# Patient Record
Sex: FEMALE | URBAN - METROPOLITAN AREA
[De-identification: names, ages, dates, MRNs, and addresses within clinical notes are randomized per-mention and may not be internally consistent; named-entity substitution may affect disease eponyms.]

---

## 2018-11-12 NOTE — PROGRESS NOTES
[place] [facility]  PRIMARY CARE PROVIDER AND CLINIC:  Priyanka Sainz [place] [address] / [place] [address]  Chief Complaint   Patient presents with     [facility]     [place] Medical Record Number:  [id number]  Place of Service where encounter took place:  [facility] ([facility]) [[id number]]    HPI:    Slim Sams is a 89 year old  (8/4/1924),admitted to the above facility from  Home.  Admitted to this facility for  rehab, medical management and nursing care.  HPI information obtained from: facility chart records, facility staff, patient report and [facility] chart review. Current issues are:    This is a 89-year-old female, with a past medical history significant for dementia, hypertension and adjustment disorder, who recently moved to [facility] from a different Assisted Living after living at home until September of 2018. Today, patient is walking up to the [employer] shop to have her hair done. Has no complaints. Reports she has children who live nearby. Was a  taking care of her children when she was younger. Denies pain and shortness of breath.    Spoke to daughter in-law, Jennifer, who reports patient has trouble with her memory. Is settling in to new assisted living, but keeps saying she's not sure how long she'll be staying. Has frequent visitors between her sons, daughter in-law and daughter. Is still able to get into her son's large truck with some help. Confirms DNR/DNI status. Would like her mother in-law to be transported to the hospital if indicated.    CODE STATUS/ADVANCE DIRECTIVES DISCUSSION:   DNR / DNI  Patient's living condition: lives in an assisted living facility    ALLERGIES:Brimonidine and Timolol maleate [timolol]  PAST MEDICAL HISTORY:  has a past medical history of Adjustment disorder with anxiety; Allergic conjunctivitis; Astigmatism; Blepharitis; Dementia; Glaucoma; Hypercholesteremia; Hypertension;  MRSA infection (2010); Osteopenia; Pseudophakia; and Varicella.  PAST SURGICAL HISTORY:  has a past surgical history that includes hysterectomy, leeann.  FAMILY HISTORY: family history includes Cancer in her sister; Cataracts in her father and mother; Cerebrovascular Disease in her mother; Glaucoma in her father; Hypertension in her mother.  SOCIAL HISTORY:  reports that she has never smoked. She has never used smokeless tobacco. She reports that she does not drink alcohol or use illicit drugs.    Post Discharge Medication Reconciliation Status: discharge medications reconciled, continue medications without change.  Current Outpatient Prescriptions   Medication Sig Dispense Refill     AMLODIPINE BESYLATE PO Take 5 mg by mouth daily       ammonium lactate (AMLACTIN) 12 % cream Apply topically 2 times daily       brinzolamide (AZOPT) 1 % ophthalmic susp 1 drop 3 times daily       calcium carbonate 600 mg-vitamin D 400 units (CALTRATE) 600-400 MG-UNIT per tablet Take 1 tablet by mouth 2 times daily       cholecalciferol (VITAMIN  -D) 1000 units capsule Take 1 capsule by mouth daily       dorzolamide (TRUSOPT) 2 % ophthalmic solution 1 drop 3 times daily       FLUOXETINE HCL PO Take 20 mg by mouth daily       FLUOXETINE HCL PO Take 10 mg by mouth as needed       MAGNESIUM OXIDE PO Take 400 mg by mouth daily       METOPROLOL SUCCINATE ER PO Take 25 mg by mouth daily       Multiple Vitamins-Minerals (CEROVITE SENIOR PO) Take by mouth daily       QUETIAPINE FUMARATE PO Take 25 mg by mouth At Bedtime       spironolactone-hydrochlorothiazide (ALDACTAZIDE) 25-25 MG per tablet Take 0.5 tablets by mouth daily       travoprost, BAK Free, (TRAVATAN Z) 0.004 % ophthalmic solution 1 drop At Bedtime         ROS:  10 point ROS of systems including Constitutional, Eyes, Respiratory, Cardiovascular, Gastroenterology, Genitourinary, Integumentary, Musculoskeletal, Psychiatric were all negative except for pertinent positives noted in my  HPI.    Exam:  /60  Pulse 58  Temp 97.7  F (36.5  C)  Resp 16  Wt 139 lb 12.8 oz (63.4 kg)  SpO2 98%  GENERAL APPEARANCE:  Alert, in no distress  ENT:  Mouth and posterior oropharynx normal, moist mucous membranes  EYES:  EOM, conjunctivae, lids, pupils and irises normal  RESP:  respiratory effort and palpation of chest normal, lungs clear to auscultation , no respiratory distress  CV:  Palpation and auscultation of heart done , regular rate and rhythm, no murmur, rub, or gallop  ABDOMEN:  normal bowel sounds, soft, nontender, no hepatosplenomegaly or other masses  M/S:   Active movement of bilateral upper and lower extremities. No edema.  SKIN:  Inspection of skin and subcutaneous tissue baseline, Palpation of skin and subcutaneous tissue baseline  NEURO:   Cranial nerves 2-12 are normal tested and grossly at patient's baseline  PSYCH:  memory impaired     Lab/Diagnostic data:   Basic Metabolic Panel 6/25/18  Sodium 139  Potassium 3.7  Chloride 101  CO2 29  Anion Gap 9  Calcium 9.8  BUN 23  Creatinine 0.69  EGFR > 60    Complete Blood Cell Count 6/25/18  WBC 7.8  RBC 4.06  Hgb 12.6  Hematocrit 37.0  MCV 91  MCH 31.1  MCHC 34.1  RDW 12.6  Platelet 210    ASSESSMENT/PLAN:  Mixed Dementia. Chronic, progressive. Requires 24 hour supervision. Resides in secure memory unit [facility]. Able to make needs known. Ambulates independently. Mini-Cog Assessment Score 1 on 6/25/18. SLUMS Score 4 on 10/30/18. Unable to tolerate Namenda. Staff to assist with ADLs and meals.    Adjustment Disorder with Anxiety. Symptoms include palpitations and headaches. Previously on Mirtazapine and Lorazepam. PHQ-2 0 on 6/25/18. Geriatric Depression Score 1 on 10/29/18. Uncertain why and when Quetiapine was initiated, but appears to have been on since at least 2015. Fluoxetine increased 6/25/18 due to increasing anxiety.     Hypertension/Hyperlipidemia. Monitor blood pressure monthly. Goal blood pressure <150/90 given age and  co-morbidities. Continue Amlodipine, Metoprolol and Spironolactone-Hydrochlorothiazide as ordered.    Glaucoma. Followed by Dr. Sary Calvillo at [facility]. Follow-up recommended after 2019. Continue Dorzolamide, Betimol and Travatan as ordered. Taking Brinzolamide, but this is not listed on Ophthalmology note. Will need to clarify with daughter in-law and/or Dr. Calvillo.     Osteopenia. Continue Calcium and Vitamin D supplements as ordered.     Total time with a new patient visit in the assisted livin minutes including discussions about the POC and care coordination with the patient and family, daughter in-law Jennifer. Greater than 50% of total time spent with counseling and coordinating care due to review of past medical record, visit with patient and phone call to daughter in-law    Electronically signed by:  NIEVES Carranza CNP

## 2018-11-13 RX ORDER — TRAVOPROST OPHTHALMIC SOLUTION 0.04 MG/ML
1 SOLUTION OPHTHALMIC AT BEDTIME
COMMUNITY
End: 2021-01-01

## 2018-11-13 RX ORDER — BRINZOLAMIDE 10 MG/ML
1 SUSPENSION/ DROPS OPHTHALMIC 3 TIMES DAILY
COMMUNITY
End: 2019-09-23

## 2018-11-13 RX ORDER — SPIRONOLACTONE AND HYDROCHLOROTHIAZIDE 25; 25 MG/1; MG/1
0.5 TABLET ORAL DAILY
COMMUNITY
End: 2020-12-15

## 2018-11-13 RX ORDER — DORZOLAMIDE HCL 20 MG/ML
1 SOLUTION/ DROPS OPHTHALMIC 3 TIMES DAILY
COMMUNITY
End: 2021-01-01

## 2018-11-13 RX ORDER — AMMONIUM LACTATE 12 G/100G
CREAM TOPICAL 2 TIMES DAILY PRN
COMMUNITY
End: 2019-10-25

## 2018-11-14 ENCOUNTER — ASSISTED LIVING VISIT (OUTPATIENT)
Dept: GERIATRICS | Facility: CLINIC | Age: 83
End: 2018-11-14
Payer: COMMERCIAL

## 2018-11-14 VITALS
OXYGEN SATURATION: 98 % | WEIGHT: 139.8 LBS | DIASTOLIC BLOOD PRESSURE: 60 MMHG | HEART RATE: 58 BPM | TEMPERATURE: 97.7 F | RESPIRATION RATE: 16 BRPM | SYSTOLIC BLOOD PRESSURE: 141 MMHG

## 2018-11-14 DIAGNOSIS — E78.00 HYPERCHOLESTEREMIA: ICD-10-CM

## 2018-11-14 DIAGNOSIS — F01.50 MIXED DEMENTIA (H): Primary | ICD-10-CM

## 2018-11-14 DIAGNOSIS — I10 ESSENTIAL HYPERTENSION: ICD-10-CM

## 2018-11-14 DIAGNOSIS — F43.22 ADJUSTMENT DISORDER WITH ANXIETY: ICD-10-CM

## 2018-11-14 DIAGNOSIS — H40.1133 PRIMARY OPEN ANGLE GLAUCOMA OF BOTH EYES, SEVERE STAGE: ICD-10-CM

## 2018-11-14 DIAGNOSIS — G30.9 MIXED DEMENTIA (H): Primary | ICD-10-CM

## 2018-11-14 DIAGNOSIS — F02.80 MIXED DEMENTIA (H): Primary | ICD-10-CM

## 2018-11-14 NOTE — LETTER
11/14/2018        RE: Slim Sams  [facility]  [address]  [place] [address]        [place] [facility]  PRIMARY CARE PROVIDER AND CLINIC:  Priyanka Sainz [address] [place] [address] / [facility] [address]  Chief Complaint   Patient presents with     Establish Care     [place] Medical Record Number:  [id number]  Place of Service where encounter took place:  [facility] [place] ([facility]) [[id number]]    HPI:    Slim Sams is a 89 year old  (8/4/1924),admitted to the above facility from  Home.  Admitted to this facility for  rehab, medical management and nursing care.  HPI information obtained from: facility chart records, facility staff, patient report and [facility] chart review. Current issues are:    This is a 89-year-old female, with a past medical history significant for dementia, hypertension and adjustment disorder, who recently moved to [facility] from a different Assisted Living after living at home until September of 2018. Today, patient is walking up to the [employer] shop to have her hair done. Has no complaints. Reports she has children who live nearby. Was a  taking care of her children when she was younger. Denies pain and shortness of breath.    Spoke to daughter in-law, Jennifer, who reports patient has trouble with her memory. Is settling in to new assisted living, but keeps saying she's not sure how long she'll be staying. Has frequent visitors between her sons, daughter in-law and daughter. Is still able to get into her son's large truck with some help. Confirms DNR/DNI status. Would like her mother in-law to be transported to the hospital if indicated.    CODE STATUS/ADVANCE DIRECTIVES DISCUSSION:   DNR / DNI  Patient's living condition: lives in an assisted living facility    ALLERGIES:Brimonidine and Timolol maleate [timolol]  PAST MEDICAL HISTORY:  has a past medical history of Adjustment  disorder with anxiety; Allergic conjunctivitis; Astigmatism; Blepharitis; Dementia; Glaucoma; Hypercholesteremia; Hypertension; MRSA infection (2010); Osteopenia; Pseudophakia; and Varicella.  PAST SURGICAL HISTORY:  has a past surgical history that includes hysterectomy, leeann.  FAMILY HISTORY: family history includes Cancer in her sister; Cataracts in her father and mother; Cerebrovascular Disease in her mother; Glaucoma in her father; Hypertension in her mother.  SOCIAL HISTORY:  reports that she has never smoked. She has never used smokeless tobacco. She reports that she does not drink alcohol or use illicit drugs.    Post Discharge Medication Reconciliation Status: discharge medications reconciled, continue medications without change.  Current Outpatient Prescriptions   Medication Sig Dispense Refill     AMLODIPINE BESYLATE PO Take 5 mg by mouth daily       ammonium lactate (AMLACTIN) 12 % cream Apply topically 2 times daily       brinzolamide (AZOPT) 1 % ophthalmic susp 1 drop 3 times daily       calcium carbonate 600 mg-vitamin D 400 units (CALTRATE) 600-400 MG-UNIT per tablet Take 1 tablet by mouth 2 times daily       cholecalciferol (VITAMIN  -D) 1000 units capsule Take 1 capsule by mouth daily       dorzolamide (TRUSOPT) 2 % ophthalmic solution 1 drop 3 times daily       FLUOXETINE HCL PO Take 20 mg by mouth daily       FLUOXETINE HCL PO Take 10 mg by mouth as needed       MAGNESIUM OXIDE PO Take 400 mg by mouth daily       METOPROLOL SUCCINATE ER PO Take 25 mg by mouth daily       Multiple Vitamins-Minerals (CEROVITE SENIOR PO) Take by mouth daily       QUETIAPINE FUMARATE PO Take 25 mg by mouth At Bedtime       spironolactone-hydrochlorothiazide (ALDACTAZIDE) 25-25 MG per tablet Take 0.5 tablets by mouth daily       travoprost, BAK Free, (TRAVATAN Z) 0.004 % ophthalmic solution 1 drop At Bedtime         ROS:  10 point ROS of systems including Constitutional, Eyes, Respiratory, Cardiovascular,  Gastroenterology, Genitourinary, Integumentary, Musculoskeletal, Psychiatric were all negative except for pertinent positives noted in my HPI.    Exam:  /60  Pulse 58  Temp 97.7  F (36.5  C)  Resp 16  Wt 139 lb 12.8 oz (63.4 kg)  SpO2 98%  GENERAL APPEARANCE:  Alert, in no distress  ENT:  Mouth and posterior oropharynx normal, moist mucous membranes  EYES:  EOM, conjunctivae, lids, pupils and irises normal  RESP:  respiratory effort and palpation of chest normal, lungs clear to auscultation , no respiratory distress  CV:  Palpation and auscultation of heart done , regular rate and rhythm, no murmur, rub, or gallop  ABDOMEN:  normal bowel sounds, soft, nontender, no hepatosplenomegaly or other masses  M/S:   Active movement of bilateral upper and lower extremities. No edema.  SKIN:  Inspection of skin and subcutaneous tissue baseline, Palpation of skin and subcutaneous tissue baseline  NEURO:   Cranial nerves 2-12 are normal tested and grossly at patient's baseline  PSYCH:  memory impaired     Lab/Diagnostic data:   Basic Metabolic Panel 6/25/18  Sodium 139  Potassium 3.7  Chloride 101  CO2 29  Anion Gap 9  Calcium 9.8  BUN 23  Creatinine 0.69  EGFR > 60    Complete Blood Cell Count 6/25/18  WBC 7.8  RBC 4.06  Hgb 12.6  Hematocrit 37.0  MCV 91  MCH 31.1  MCHC 34.1  RDW 12.6  Platelet 210    ASSESSMENT/PLAN:  Mixed Dementia. Chronic, progressive. Requires 24 hour supervision. Resides in secure memory unit of AL. Able to make needs known. Ambulates independently. Mini-Cog Assessment Score 1 on 6/25/18. SLUMS Score 4 on 10/30/18. Unable to tolerate Namenda. Staff to assist with ADLs and meals.    Adjustment Disorder with Anxiety. Symptoms include palpitations and headaches. Previously on Mirtazapine and Lorazepam. PHQ-2 0 on 6/25/18. Geriatric Depression Score 1 on 10/29/18. Uncertain why and when Quetiapine was initiated, but appears to have been on since at least 2015. Fluoxetine increased 6/25/18 due to  increasing anxiety.     Hypertension/Hyperlipidemia. Monitor blood pressure monthly. Goal blood pressure <150/90 given age and co-morbidities. Continue Amlodipine, Metoprolol and Spironolactone-Hydrochlorothiazide as ordered.    Glaucoma. Followed by Dr. Sary Calvillo at [facility]. Follow-up recommended after 2019. Continue Dorzolamide, Betimol and Travatan as ordered. Taking Brinzolamide, but this is not listed on Ophthalmology note. Will need to clarify with daughter in-law and/or Dr. Calvillo.     Osteopenia. Continue Calcium and Vitamin D supplements as ordered.     Total time with a new patient visit in the assisted livin minutes including discussions about the POC and care coordination with the patient and family, daughter in-law Jennifer. Greater than 50% of total time spent with counseling and coordinating care due to review of past medical record, visit with patient and phone call to daughter in-law    Electronically signed by:  NIEVES Carranza CNP                    Sincerely,        NIEVES Carranza CNP

## 2018-11-15 PROBLEM — G30.9 MIXED DEMENTIA (H): Status: ACTIVE | Noted: 2018-09-17

## 2018-11-15 PROBLEM — F01.50 MIXED DEMENTIA (H): Status: ACTIVE | Noted: 2018-09-17

## 2018-11-15 PROBLEM — F02.80 MIXED DEMENTIA (H): Status: ACTIVE | Noted: 2018-09-17

## 2019-01-09 ENCOUNTER — ASSISTED LIVING VISIT (OUTPATIENT)
Dept: GERIATRICS | Facility: CLINIC | Age: 84
End: 2019-01-09
Payer: MEDICARE

## 2019-01-09 VITALS
DIASTOLIC BLOOD PRESSURE: 62 MMHG | SYSTOLIC BLOOD PRESSURE: 128 MMHG | WEIGHT: 143.6 LBS | TEMPERATURE: 98.2 F | OXYGEN SATURATION: 97 % | HEART RATE: 60 BPM | RESPIRATION RATE: 16 BRPM

## 2019-01-09 DIAGNOSIS — E78.5 HYPERLIPIDEMIA, UNSPECIFIED HYPERLIPIDEMIA TYPE: ICD-10-CM

## 2019-01-09 DIAGNOSIS — F03.90 DEMENTIA WITHOUT BEHAVIORAL DISTURBANCE, UNSPECIFIED DEMENTIA TYPE: Primary | ICD-10-CM

## 2019-01-09 DIAGNOSIS — I10 BENIGN ESSENTIAL HYPERTENSION: ICD-10-CM

## 2019-01-09 DIAGNOSIS — H40.9 GLAUCOMA, UNSPECIFIED GLAUCOMA TYPE, UNSPECIFIED LATERALITY: ICD-10-CM

## 2019-01-09 DIAGNOSIS — F43.22 ADJUSTMENT DISORDER WITH ANXIOUS MOOD: ICD-10-CM

## 2019-01-09 DIAGNOSIS — M85.80 OSTEOPENIA, UNSPECIFIED LOCATION: ICD-10-CM

## 2019-01-09 NOTE — PROGRESS NOTES
[place] [facility]  Chief Complaint   Patient presents with     Annual Comprehensive Exam Assisted Living       [place] Medical Record Number:  [id number]  Place of Service where encounter took place:  [facility] ([facility] [[id number]]    HPI:    Slim Sams is a 89 year old  (8/4/1924), who is being seen today for an annual comprehensive visit.  HPI information obtained from: facility chart records, facility staff, patient report, [facility] chart review, Care Everywhere [facility] chart review and family/first contact Jennifer, daughter in-law report.  Today's concerns are:    Mixed Dementia. Resides in secure memory unit of AL. Able to make needs known. Ambulates independently. Mini-Cog Assessment Score 1 on 6/25/18. SLUMS Score 4 on 10/30/18.      Adjustment Disorder with Anxiety. No reports of behaviors noted by staff since AL admission 10/27/18. Per daughter in-law's report, when patient was initially admitted to [facility] in September, had difficulty sleeping at night. Required daughter in-law stay with her overnight as she settled in. Since that time, has really had no displays of anxiety. Enjoys the AL. Takes care of herself. Likes to look nice and talk to other residents.      Hypertension/Hyperlipidemia. Upon review of blood pressure over the past 3 months, 2 readings available to review, 128/62 and  143/65. No recent lipid panel on file.      Glaucoma.  No recent reports of vision changes. Followed by Dr. Sary Calvillo at [facility]. Has an allergy to Timolol, but remains on this eye drop.  Son and daughter in-law are aware of this.      Osteopenia. No reports of pain.     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  PAST SURGICAL HISTORY:  has a past surgical history that includes hysterectomy, leeann.  FAMILY HISTORY: family history includes Cancer in her sister; Cataracts in her father and mother; Cerebrovascular Disease in her mother; Glaucoma in her father; Hypertension in her mother.  SOCIAL HISTORY:  reports that  has never smoked. she has never used smokeless tobacco. She reports that she does not drink alcohol or use drugs.  IMMUNIZATIONS:  Most Recent Immunizations   Administered Date(s) Administered     DTaP, Unspecified 05/11/2010     Flu, Unspecified 10/15/1996     Influenza (High Dose) 3 valent vaccine 09/06/2018     Pneumo Conj 13-V (2010&after) 08/03/2015     Pneumococcal 23 valent 10/31/1995     Above immunizations pulled from [facility]. [facility] and facility records also reconciled. Outstanding information sent to  to update [facility].  Future immunizations are not needed at this point as all recommended immunizations are up to date.   MEDICATIONS:  Current Outpatient Medications   Medication Sig Dispense Refill     AMLODIPINE BESYLATE PO Take 5 mg by mouth daily       ammonium lactate (AMLACTIN) 12 % cream Apply topically 2 times daily       brinzolamide (AZOPT) 1 % ophthalmic susp 1 drop 3 times daily       calcium carbonate 600 mg-vitamin D 400 units (CALTRATE) 600-400 MG-UNIT per tablet Take 1 tablet by mouth 2 times daily       cholecalciferol (VITAMIN  -D) 1000 units capsule Take 1 capsule by mouth daily       dorzolamide  (TRUSOPT) 2 % ophthalmic solution 1 drop 3 times daily       FLUOXETINE HCL PO Take 20 mg by mouth daily       FLUOXETINE HCL PO Take 10 mg by mouth as needed       MAGNESIUM OXIDE PO Take 400 mg by mouth daily       METOPROLOL SUCCINATE ER PO Take 25 mg by mouth daily       Multiple Vitamins-Minerals (CEROVITE SENIOR PO) Take 1 tablet by mouth daily        QUETIAPINE FUMARATE PO Take 25 mg by mouth At Bedtime       spironolactone-hydrochlorothiazide (ALDACTAZIDE) 25-25 MG per tablet Take 0.5 tablets by mouth daily       timolol hemihydrate (BETIMOL) 0.5 % SOLN ophthalmic solution Place 1 drop into both eyes daily       travoprost, BAK Free, (TRAVATAN Z) 0.004 % ophthalmic solution 1 drop At Bedtime       Medications reviewed:  Medications reconciled to facility chart and changes were made to reflect current medications as identified as above med list. Below are the changes that were made:   Medications stopped since last EPIC medication reconciliation:   There are no discontinued medications.    Medications started since last [facility] medication reconciliation:  No orders of the defined types were placed in this encounter.      Case Management:  I have reviewed the Assisted Living care plan, current immunizations and preventive care/cancer screening..Future cancer screening is not clinically indicated secondary to age/goals of care Patient's desire to return to the community is not present. Current Level of Care is appropriate.    Advance Directive Discussion:    I reviewed the current advanced directives as reflected in EPIC, the POLST and the facility chart, and verified the congruency of orders. I contacted the first party Jennifer Sams and discussed the plan of Care.  I did not due to cognitive impairment review the advance directives with the resident.     Team Discussion:  I communicated with the appropriate disciplines involved with the Plan of Care:   Nursing      Patient Goal:  Patient's goal is pain control  and comfort.    Information reviewed:  Medications, vital signs, orders, and nursing notes.    ROS:  4 point ROS including Respiratory, CV, GI and , other than that noted in the HPI,  is negative    Exam:  /62   Pulse 60   Temp 98.2  F (36.8  C)   Resp 16   Wt 65.1 kg (143 lb 9.6 oz)   SpO2 97%   GENERAL APPEARANCE:  Alert, in no distress  ENT:  Mouth and posterior oropharynx normal, moist mucous membranes  EYES:  EOM, conjunctivae, lids, pupils and irises normal  RESP:  respiratory effort and palpation of chest normal, lungs clear to auscultation , no respiratory distress  CV:  Palpation and auscultation of heart done , regular rate and rhythm, no murmur, rub, or gallop  ABDOMEN:  normal bowel sounds, soft, nontender, no hepatosplenomegaly or other masses  M/S:   Active movement of bilateral upper and lower extremities. No edema.  SKIN:  Inspection of skin and subcutaneous tissue baseline, Palpation of skin and subcutaneous tissue baseline  NEURO:   Cranial nerves 2-12 are normal tested and grossly at patient's baseline  PSYCH:  memory impaired      Lab/Diagnostic data:   Basic Metabolic Panel 6/25/18  Sodium 139  Potassium 3.7  Chloride 101  CO2 29  Anion Gap 9  Calcium 9.8  BUN 23  Creatinine 0.69  EGFR > 60    Complete Blood Cell Count 6/25/18  WBC 7.8  RBC 4.06  Hgb 12.6  Hematocrit 37.0  MCV 91  MCH 31.1  MCHC 34.1  RDW 12.6  Platelet 210    ASSESSMENT/PLAN  Mixed Dementia. Chronic, progressive. Requires 24 hour supervision. Resides in secure memory unit of AL. Able to make needs known. Ambulates independently. Mini-Cog Assessment Score 1 on 6/25/18. SLUMS Score 4 on 10/30/18. Unable to tolerate Namenda. Staff to assist with ADLs and meals.     Adjustment Disorder with Anxiety. Symptoms include palpitations and headaches. Previously on Mirtazapine and Lorazepam. PHQ-2 0 on 6/25/18. Geriatric Depression Score 1 on 10/29/18. Uncertain why and when Quetiapine was initiated, but appears to have been  on since at least 2015. Fluoxetine increased 6/25/18 due to increasing anxiety. Discussed with daughter in-law, Jennifer, decreasing Quetiapine to determine if medication is still indicated. Daughter in-law was in agreement with decreasing Quetiapine from 25 mg PO at bedtime to 12.5 mg PO at bedtime. Also question the need for Fluoxetine PRN. Will follow-up with medication dosing at next visit.      Hypertension/Hyperlipidemia. Monitor blood pressure monthly. Goal blood pressure <150/90 given age and co-morbidities. Continue Amlodipine, Metoprolol and Spironolactone-Hydrochlorothiazide as ordered. Will need to check lipid panel with next labs. Based on JNC-8 goals,  patients age of 89 year old, no presence of diabetes or CKD, and goals of care goal BP is <150/90 mm Hg. Patient is stable with current plan of care and routine assessment..     Glaucoma. Followed by Dr. Sary Calvillo at [facility]. Follow-up recommended after March 14, 2019. Continue Dorzolamide, Betimol, Brinzolamide and Travatan as ordered.       Osteopenia. Continue Calcium and Vitamin D supplements as ordered    Electronically signed by:  NIEVES Carranza CNP

## 2019-01-09 NOTE — LETTER
1/9/2019        [facility]  [address] [facility] [address]        [place] [facility]  Chief Complaint   Patient presents with     Annual Comprehensive Exam Assisted Living       [place] Medical Record Number:  [id number]  [facility] [place] [facility] [[id number]]    HPI:    Slim Sams is a 89 year old  (8/4/1924), who is being seen today for an annual comprehensive visit.  HPI information obtained from: facility chart records, facility staff, patient report, [place] Epic chart review, Care Everywhere Epic chart review and family/first contact Jennifer, daughter in-law report.  Today's concerns are:    Mixed Dementia. Resides in secure memory unit of AL. Able to make needs known. Ambulates independently. Mini-Cog Assessment Score 1 on 6/25/18. SLUMS Score 4 on 10/30/18.      Adjustment Disorder with Anxiety. No reports of behaviors noted by staff since AL admission 10/27/18. Per daughter in-law's report, when patient was initially admitted to [facility] in September, had difficulty sleeping at night. Required daughter in-law stay with her overnight as she settled in. Since that time, has really had no displays of anxiety. Enjoys the AL. Takes care of herself. Likes to look nice and talk to other residents.      Hypertension/Hyperlipidemia. Upon review of blood pressure over the past 3 months, 2 readings available to review, 128/62 and  143/65. No recent lipid panel on file.      Glaucoma.  No recent reports of vision changes. Followed by Dr. Sary Calvillo at [facility]. Has an allergy to Timolol, but remains on this eye drop.  Son and daughter in-law are aware of this.      Osteopenia. No reports of pain.     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  PAST SURGICAL HISTORY:  has a past surgical history that includes hysterectomy, leeann.  FAMILY HISTORY: family history includes Cancer in her sister; Cataracts in her father and mother; Cerebrovascular Disease in her mother; Glaucoma in her father; Hypertension in her mother.  SOCIAL HISTORY:  reports that  has never smoked. she has never used smokeless tobacco. She reports that she does not drink alcohol or use drugs.  IMMUNIZATIONS:  Most Recent Immunizations   Administered Date(s) Administered     DTaP, Unspecified 05/11/2010     Flu, Unspecified 10/15/1996     Influenza (High Dose) 3 valent vaccine 09/06/2018     Pneumo Conj 13-V (2010&after) 08/03/2015     Pneumococcal 23 valent 10/31/1995     Above immunizations pulled from [facility]. [facility] and facility records also reconciled. Outstanding information sent to  to update [facility].  Future immunizations are not needed at this point as all recommended immunizations are up to date.   MEDICATIONS:  Current Outpatient Medications   Medication Sig Dispense Refill     AMLODIPINE BESYLATE PO Take 5 mg by mouth daily       ammonium lactate (AMLACTIN) 12 % cream Apply topically 2 times daily       brinzolamide (AZOPT) 1 % ophthalmic susp 1 drop 3 times daily       calcium carbonate 600 mg-vitamin D 400 units (CALTRATE) 600-400 MG-UNIT per tablet Take 1 tablet by mouth 2 times  daily       cholecalciferol (VITAMIN  -D) 1000 units capsule Take 1 capsule by mouth daily       dorzolamide (TRUSOPT) 2 % ophthalmic solution 1 drop 3 times daily       FLUOXETINE HCL PO Take 20 mg by mouth daily       FLUOXETINE HCL PO Take 10 mg by mouth as needed       MAGNESIUM OXIDE PO Take 400 mg by mouth daily       METOPROLOL SUCCINATE ER PO Take 25 mg by mouth daily       Multiple Vitamins-Minerals (CEROVITE SENIOR PO) Take 1 tablet by mouth daily        QUETIAPINE FUMARATE PO Take 25 mg by mouth At Bedtime       spironolactone-hydrochlorothiazide (ALDACTAZIDE) 25-25 MG per tablet Take 0.5 tablets by mouth daily       timolol hemihydrate (BETIMOL) 0.5 % SOLN ophthalmic solution Place 1 drop into both eyes daily       travoprost, BAK Free, (TRAVATAN Z) 0.004 % ophthalmic solution 1 drop At Bedtime       Medications reviewed:  Medications reconciled to facility chart and changes were made to reflect current medications as identified as above med list. Below are the changes that were made:   Medications stopped since last EPIC medication reconciliation:   There are no discontinued medications.    Medications started since last [facility] medication reconciliation:  No orders of the defined types were placed in this encounter.      Case Management:  I have reviewed the Assisted Living care plan, current immunizations and preventive care/cancer screening..Future cancer screening is not clinically indicated secondary to age/goals of care Patient's desire to return to the community is not present. Current Level of Care is appropriate.    Advance Directive Discussion:    I reviewed the current advanced directives as reflected in EPIC, the POLST and the facility chart, and verified the congruency of orders. I contacted the first party Jennifer Sams and discussed the plan of Care.  I did not due to cognitive impairment review the advance directives with the resident.     Team Discussion:  I communicated with the  appropriate disciplines involved with the Plan of Care:   Nursing      Patient Goal:  Patient's goal is pain control and comfort.    Information reviewed:  Medications, vital signs, orders, and nursing notes.    ROS:  4 point ROS including Respiratory, CV, GI and , other than that noted in the HPI,  is negative    Exam:  /62   Pulse 60   Temp 98.2  F (36.8  C)   Resp 16   Wt 65.1 kg (143 lb 9.6 oz)   SpO2 97%   GENERAL APPEARANCE:  Alert, in no distress  ENT:  Mouth and posterior oropharynx normal, moist mucous membranes  EYES:  EOM, conjunctivae, lids, pupils and irises normal  RESP:  respiratory effort and palpation of chest normal, lungs clear to auscultation , no respiratory distress  CV:  Palpation and auscultation of heart done , regular rate and rhythm, no murmur, rub, or gallop  ABDOMEN:  normal bowel sounds, soft, nontender, no hepatosplenomegaly or other masses  M/S:   Active movement of bilateral upper and lower extremities. No edema.  SKIN:  Inspection of skin and subcutaneous tissue baseline, Palpation of skin and subcutaneous tissue baseline  NEURO:   Cranial nerves 2-12 are normal tested and grossly at patient's baseline  PSYCH:  memory impaired      Lab/Diagnostic data:   Basic Metabolic Panel 6/25/18  Sodium 139  Potassium 3.7  Chloride 101  CO2 29  Anion Gap 9  Calcium 9.8  BUN 23  Creatinine 0.69  EGFR > 60    Complete Blood Cell Count 6/25/18  WBC 7.8  RBC 4.06  Hgb 12.6  Hematocrit 37.0  MCV 91  MCH 31.1  MCHC 34.1  RDW 12.6  Platelet 210    ASSESSMENT/PLAN  Mixed Dementia. Chronic, progressive. Requires 24 hour supervision. Resides in secure [facility] unit [facility]. Able to make needs known. Ambulates independently. Mini-Cog Assessment Score 1 on 6/25/18. SLUMS Score 4 on 10/30/18. Unable to tolerate Namenda. Staff to assist with ADLs and meals.     Adjustment Disorder with Anxiety. Symptoms include palpitations and headaches. Previously on Mirtazapine and Lorazepam. PHQ-2 0 on 6/25/18.  Geriatric Depression Score 1 on 10/29/18. Uncertain why and when Quetiapine was initiated, but appears to have been on since at least 2015. Fluoxetine increased 6/25/18 due to increasing anxiety. Discussed with daughter in-law, Jennifer, decreasing Quetiapine to determine if medication is still indicated. Daughter in-law was in agreement with decreasing Quetiapine from 25 mg PO at bedtime to 12.5 mg PO at bedtime. Also question the need for Fluoxetine PRN. Will follow-up with medication dosing at next visit.      Hypertension/Hyperlipidemia. Monitor blood pressure monthly. Goal blood pressure <150/90 given age and co-morbidities. Continue Amlodipine, Metoprolol and Spironolactone-Hydrochlorothiazide as ordered. Will need to check lipid panel with next labs.      Glaucoma. Followed by Dr. Sary Calvillo at [facility]. Follow-up recommended after March 14, 2019. Continue Dorzolamide, Betimol, Brinzolamide and Travatan as ordered.       Osteopenia. Continue Calcium and Vitamin D supplements as ordered    Electronically signed by:  NIEVES Carranza CNP          Sincerely,        NIEVES Carranza CNP

## 2019-01-23 DIAGNOSIS — H40.9 GLAUCOMA, UNSPECIFIED GLAUCOMA TYPE, UNSPECIFIED LATERALITY: Primary | ICD-10-CM

## 2019-01-23 RX ORDER — TIMOLOL MALEATE 5 MG/ML
SOLUTION/ DROPS OPHTHALMIC
Qty: 5 ML | Refills: 97 | Status: SHIPPED | OUTPATIENT
Start: 2019-01-23 | End: 2020-01-29

## 2019-07-11 ENCOUNTER — ASSISTED LIVING VISIT (OUTPATIENT)
Dept: GERIATRICS | Facility: CLINIC | Age: 84
End: 2019-07-11
Payer: MEDICARE

## 2019-07-11 VITALS
HEART RATE: 56 BPM | TEMPERATURE: 98.8 F | RESPIRATION RATE: 18 BRPM | DIASTOLIC BLOOD PRESSURE: 63 MMHG | SYSTOLIC BLOOD PRESSURE: 136 MMHG | WEIGHT: 139 LBS

## 2019-07-11 DIAGNOSIS — I10 BENIGN ESSENTIAL HYPERTENSION: ICD-10-CM

## 2019-07-11 DIAGNOSIS — F43.22 ADJUSTMENT DISORDER WITH ANXIOUS MOOD: ICD-10-CM

## 2019-07-11 DIAGNOSIS — F03.90 DEMENTIA WITHOUT BEHAVIORAL DISTURBANCE, UNSPECIFIED DEMENTIA TYPE: Primary | ICD-10-CM

## 2019-07-11 NOTE — LETTER
7/11/2019        RE: Slim Sams  [facility]  [address] [address] [address]        [place] [facility]  [place] Medical Record Number:  [id number]  [facility] [place] [facility] [[id number]]  Chief Complaint   Patient presents with     RECHECK     HPI:    Slim Sams  is a 89 year old (8/4/1924), who is being seen today for an episodic care visit.  HPI information obtained from: facility chart records, facility staff, patient report, [facility] chart review and family/first contact daughter in-law, Jennifer report. Today's concern is:    Dementia without Behavioral Disturbance. Per daughter in-lawJennifer's, report, patient went to her cabin with her family on 7/4/19 through 7/6/19. Questioned who's cabin it was and who was paying for it. Felt better at night sleeping with her daughter in-law. Would wake up and use the bathroom in the middle of the night. Calls her son in the evening on most days. Doesn't remember where she is. Doesn't remember eating.Describes a situation where she would have a bowel movement in her underwear and then put her underwear back in her drawer. Per patient's report, questions where she is and how long she will be there. Requests to have daughter in-law's phone number. Has no other complaints.     Adjustment Disorder with Anxiety. Per daughter in-law's report, patient calls her sons most times in the evening or middle of the night as noted above. Has to be reminded where she is. Takes awhile to talk her down. May call back later and repeat the same questions. States it is challenging for patient's son to talk her down when she can't remember where she is or previous conversations.     Hypertension. Upon review of blood pressure over the past 3 months, 1 reading available to review on 4/13/19. 136/53.    Past Medical and Surgical History reviewed in Epic  today.    MEDICATIONS:  Current Outpatient Medications   Medication Sig Dispense Refill     AMLODIPINE BESYLATE PO Take 5 mg by mouth daily       ammonium lactate (AMLACTIN) 12 % cream Apply topically 2 times daily as needed        brinzolamide (AZOPT) 1 % ophthalmic susp 1 drop 3 times daily       calcium carbonate 600 mg-vitamin D 400 units (CALTRATE) 600-400 MG-UNIT per tablet Take 1 tablet by mouth 2 times daily       cholecalciferol (VITAMIN  -D) 1000 units capsule Take 1 capsule by mouth daily       dorzolamide (TRUSOPT) 2 % ophthalmic solution 1 drop 3 times daily       FLUOXETINE HCL PO Take 30 mg by mouth daily        MAGNESIUM OXIDE PO Take 400 mg by mouth daily       METOPROLOL SUCCINATE ER PO Take 25 mg by mouth daily       Multiple Vitamins-Minerals (CEROVITE SENIOR PO) Take 1 tablet by mouth daily        QUETIAPINE FUMARATE PO Take 12.5 mg by mouth At Bedtime        spironolactone-hydrochlorothiazide (ALDACTAZIDE) 25-25 MG per tablet Take 0.5 tablets by mouth daily       timolol maleate (TIMOPTIC) 0.5 % ophthalmic solution INSTILL ONE DROP IN EACH EYE ONCE DAILY 5 mL 97     travoprost, BAK Free, (TRAVATAN Z) 0.004 % ophthalmic solution 1 drop At Bedtime       REVIEW OF SYSTEMS:  Unobtainable secondary to cognitive impairment.     Objective:  /63   Pulse 56   Temp 98.8  F (37.1  C)   Resp 18   Wt 63 kg (139 lb)   Exam:  GENERAL APPEARANCE:  Alert, in no distress  ENT:  Mouth and posterior oropharynx normal, moist mucous membranes  EYES:  EOM, conjunctivae, lids, pupils and irises normal  RESP:  respiratory effort and palpation of chest normal, lungs clear to auscultation , no respiratory distress  CV:  Palpation and auscultation of heart done , regular rate and rhythm, no murmur, rub, or gallop  ABDOMEN:  normal bowel sounds, soft, nontender, no hepatosplenomegaly or other masses  M/S:   Active movement of bilateral upper and lower extremities. No edema.  SKIN:  Inspection of skin and  subcutaneous tissue baseline, Palpation of skin and subcutaneous tissue baseline  NEURO:   Cranial nerves 2-12 are normal tested and grossly at patient's baseline  PSYCH:  memory impaired     Labs:   Basic Metabolic Panel 6/25/18  Sodium 139  Potassium 3.7  Chloride 101  CO2 29  Anion Gap 9  Calcium 9.8  BUN 23  Creatinine 0.69  EGFR > 60     Complete Blood Cell Count 6/25/18  WBC 7.8  RBC 4.06  Hgb 12.6  Hematocrit 37.0  MCV 91  MCH 31.1  MCHC 34.1  RDW 12.6  Platelet 210    ASSESSMENT/PLAN:  Dementia without Behavioral Disturbance. Chronic, progressive. Requires 24 hour supervision. Resides in secure memory unit [facility]. Able to make needs known. Ambulates independently. Mini-Cog Assessment Score 1 on 6/25/18. SLUMS Score 4 on 10/30/18. Unable to tolerate Namenda. Staff to assist with ADLs and meals.Educated daughter in-law on disease and disease progression.     Adjustment Disorder with Anxiety. Symptoms include palpitations and headaches per former PCP notes. Previously on Mirtazapine and Lorazepam. Quetiapine dose decreased on 1/9/19. Fluoxetine increased 6/25/18 due to increasing anxiety.Previously on Fluoxetine 10 mg PO every day PRN, but this was discontinued due to non-use on 7/5/19. Unclear why this serotonin specific reuptake inhibitor was initially selected. Due to daughter in-law's report of increased anxiety, will increase Fluoxetine to 30 mg PO every day. If no improvement in anxiety or worsening symptoms/behaviors at night, consider increasing Quetiapine to determine if symptoms improve. Will also check BMP later this month to ensure stability while on medication.     Hypertension. Limited blood pressures available to review. Should be receiving monthly vital signs given enrollment in [facility]. Continue Amlodipine, Metoprolol and Spironolactone-Hydrochlorothiazide as ordered for now.      Electronically signed by:  NIEVES Carranza CNP           Sincerely,        NIEVES Carranza  CNP

## 2019-07-11 NOTE — PROGRESS NOTES
[place] [facility]  [place] Medical Record Number:  [id number]  Place of Service where encounter took place:  [facility] [place] ([facility]) [[id number]]  Chief Complaint   Patient presents with     RECHECK     HPI:    Slim Sams  is a 89 year old (8/4/1924), who is being seen today for an episodic care visit.  HPI information obtained from: facility chart records, facility staff, patient report, [facility] chart review and family/first contact daughter in-lawJennifer report. Today's concern is:    Dementia without Behavioral Disturbance. Per daughter in-lawJennifer's, report, patient went to her cabin with her family on 7/4/19 through 7/6/19. Questioned who's cabin it was and who was paying for it. Felt better at night sleeping with her daughter in-law. Would wake up and use the bathroom in the middle of the night. Calls her son in the evening on most days. Doesn't remember where she is. Doesn't remember eating.Describes a situation where she would have a bowel movement in her underwear and then put her underwear back in her drawer. Per patient's report, questions where she is and how long she will be there. Requests to have daughter in-law's phone number. Has no other complaints.     Adjustment Disorder with Anxiety. Per daughter in-law's report, patient calls her sons most times in the evening or middle of the night as noted above. Has to be reminded where she is. Takes awhile to talk her down. May call back later and repeat the same questions. States it is challenging for patient's son to talk her down when she can't remember where she is or previous conversations.     Hypertension. Upon review of blood pressure over the past 3 months, 1 reading available to review on 4/13/19. 136/53.    Past Medical and Surgical History reviewed in Epic today.    MEDICATIONS:  Current Outpatient Medications   Medication Sig Dispense Refill     AMLODIPINE BESYLATE PO Take 5 mg by mouth  daily       ammonium lactate (AMLACTIN) 12 % cream Apply topically 2 times daily as needed        brinzolamide (AZOPT) 1 % ophthalmic susp 1 drop 3 times daily       calcium carbonate 600 mg-vitamin D 400 units (CALTRATE) 600-400 MG-UNIT per tablet Take 1 tablet by mouth 2 times daily       cholecalciferol (VITAMIN  -D) 1000 units capsule Take 1 capsule by mouth daily       dorzolamide (TRUSOPT) 2 % ophthalmic solution 1 drop 3 times daily       FLUOXETINE HCL PO Take 30 mg by mouth daily        MAGNESIUM OXIDE PO Take 400 mg by mouth daily       METOPROLOL SUCCINATE ER PO Take 25 mg by mouth daily       Multiple Vitamins-Minerals (CEROVITE SENIOR PO) Take 1 tablet by mouth daily        QUETIAPINE FUMARATE PO Take 12.5 mg by mouth At Bedtime        spironolactone-hydrochlorothiazide (ALDACTAZIDE) 25-25 MG per tablet Take 0.5 tablets by mouth daily       timolol maleate (TIMOPTIC) 0.5 % ophthalmic solution INSTILL ONE DROP IN EACH EYE ONCE DAILY 5 mL 97     travoprost, BAK Free, (TRAVATAN Z) 0.004 % ophthalmic solution 1 drop At Bedtime       REVIEW OF SYSTEMS:  Unobtainable secondary to cognitive impairment.     Objective:  /63   Pulse 56   Temp 98.8  F (37.1  C)   Resp 18   Wt 63 kg (139 lb)   Exam:  GENERAL APPEARANCE:  Alert, in no distress  ENT:  Mouth and posterior oropharynx normal, moist mucous membranes  EYES:  EOM, conjunctivae, lids, pupils and irises normal  RESP:  respiratory effort and palpation of chest normal, lungs clear to auscultation , no respiratory distress  CV:  Palpation and auscultation of heart done , regular rate and rhythm, no murmur, rub, or gallop  ABDOMEN:  normal bowel sounds, soft, nontender, no hepatosplenomegaly or other masses  M/S:   Active movement of bilateral upper and lower extremities. No edema.  SKIN:  Inspection of skin and subcutaneous tissue baseline, Palpation of skin and subcutaneous tissue baseline  NEURO:   Cranial nerves 2-12 are normal tested and grossly  at patient's baseline  PSYCH:  memory impaired     Labs:   Basic Metabolic Panel 6/25/18  Sodium 139  Potassium 3.7  Chloride 101  CO2 29  Anion Gap 9  Calcium 9.8  BUN 23  Creatinine 0.69  EGFR > 60     Complete Blood Cell Count 6/25/18  WBC 7.8  RBC 4.06  Hgb 12.6  Hematocrit 37.0  MCV 91  MCH 31.1  MCHC 34.1  RDW 12.6  Platelet 210    ASSESSMENT/PLAN:  Dementia without Behavioral Disturbance. Chronic, progressive. Requires 24 hour supervision. Resides in secure [facility] unit of AL. Able to make needs known. Ambulates independently. Mini-Cog Assessment Score 1 on 6/25/18. SLUMS Score 4 on 10/30/18. Unable to tolerate Namenda. Staff to assist with ADLs and meals.Educated daughter in-law on disease and disease progression.     Adjustment Disorder with Anxiety. Symptoms include palpitations and headaches per former PCP notes. Previously on Mirtazapine and Lorazepam. Quetiapine dose decreased on 1/9/19. Fluoxetine increased 6/25/18 due to increasing anxiety.Previously on Fluoxetine 10 mg PO every day PRN, but this was discontinued due to non-use on 7/5/19. Unclear why this serotonin specific reuptake inhibitor was initially selected. Due to daughter in-law's report of increased anxiety, will increase Fluoxetine to 30 mg PO every day. If no improvement in anxiety or worsening symptoms/behaviors at night, consider increasing Quetiapine to determine if symptoms improve. Will also check BMP later this month to ensure stability while on medication.     Hypertension. Limited blood pressures available to review. Should be receiving monthly vital signs given enrollment in [facility]. Continue Amlodipine, Metoprolol and Spironolactone-Hydrochlorothiazide as ordered for now.      Electronically signed by:  NIEVES Carranza CNP

## 2019-07-24 ENCOUNTER — TRANSFERRED RECORDS (OUTPATIENT)
Dept: HEALTH INFORMATION MANAGEMENT | Facility: CLINIC | Age: 84
End: 2019-07-24

## 2019-07-24 ENCOUNTER — RECORDS - HEALTHEAST (OUTPATIENT)
Dept: LAB | Facility: CLINIC | Age: 84
End: 2019-07-24

## 2019-07-24 LAB
ANION GAP SERPL CALCULATED.3IONS-SCNC: 10 MMOL/L (ref 5–18)
ANION GAP SERPL CALCULATED.3IONS-SCNC: 10 MMOL/L (ref 5–18)
BUN SERPL-MCNC: 26 MG/DL (ref 8–28)
BUN SERPL-MCNC: 26 MG/DL (ref 8–28)
CALCIUM SERPL-MCNC: 10.1 MG/DL (ref 8.5–10.5)
CALCIUM SERPL-MCNC: 10.1 MG/DL (ref 8.5–10.5)
CHLORIDE BLD-SCNC: 100 MMOL/L (ref 98–107)
CHLORIDE SERPLBLD-SCNC: 100 MMOL/L (ref 98–107)
CO2 SERPL-SCNC: 29 MMOL/L (ref 22–31)
CO2 SERPL-SCNC: 29 MMOL/L (ref 22–31)
CREAT SERPL-MCNC: 0.96 MG/DL (ref 0.6–1.1)
CREAT SERPL-MCNC: 0.96 MG/DL (ref 0.6–1.1)
ERYTHROCYTE [DISTWIDTH] IN BLOOD BY AUTOMATED COUNT: 14 % (ref 11–14.5)
ERYTHROCYTE [DISTWIDTH] IN BLOOD BY AUTOMATED COUNT: 14 % (ref 11–14.5)
GFR SERPL CREATININE-BSD FRML MDRD: 54 ML/MIN/1.73M2
GFR SERPL CREATININE-BSD FRML MDRD: 54 ML/MIN/1.73M2
GLUCOSE BLD-MCNC: 97 MG/DL (ref 70–125)
GLUCOSE SERPL-MCNC: 97 MG/DL (ref 70–125)
HCT VFR BLD AUTO: 38.7 % (ref 35–47)
HCT VFR BLD AUTO: 38.7 % (ref 35–47)
HEMOGLOBIN: 12.2 G/DL (ref 12–16)
HGB BLD-MCNC: 12.2 G/DL (ref 12–16)
MCH RBC QN AUTO: 30.6 PG (ref 27–34)
MCH RBC QN AUTO: 30.6 PG (ref 27–34)
MCHC RBC AUTO-ENTMCNC: 31.5 G/DL (ref 32–36)
MCHC RBC AUTO-ENTMCNC: 31.5 G/DL (ref 32–36)
MCV RBC AUTO: 97 FL (ref 80–100)
MCV RBC AUTO: 97 FL (ref 80–100)
PLATELET # BLD AUTO: 200 THOU/UL (ref 140–440)
PLATELET # BLD AUTO: 200 THOU/UL (ref 140–440)
PMV BLD AUTO: 10.6 FL (ref 8.5–12.5)
POTASSIUM BLD-SCNC: 3.7 MMOL/L (ref 3.5–5)
POTASSIUM SERPL-SCNC: 3.7 MMOL/L (ref 3.5–5)
RBC # BLD AUTO: 3.99 MILL/UL (ref 3.8–5.4)
RBC # BLD AUTO: 3.99 MILL/UL (ref 3.8–5.4)
SODIUM SERPL-SCNC: 139 MMOL/L (ref 136–145)
SODIUM SERPL-SCNC: 139 MMOL/L (ref 136–145)
WBC # BLD AUTO: 6.7 THOU/UL (ref 4–11)
WBC: 6.7 THOU/UL (ref 4–11)

## 2019-07-28 DIAGNOSIS — F32.A DEPRESSION, UNSPECIFIED DEPRESSION TYPE: ICD-10-CM

## 2019-07-28 DIAGNOSIS — F03.90 DEMENTIA WITHOUT BEHAVIORAL DISTURBANCE, UNSPECIFIED DEMENTIA TYPE: Primary | ICD-10-CM

## 2019-07-28 RX ORDER — QUETIAPINE FUMARATE 25 MG/1
TABLET, FILM COATED ORAL
Qty: 14 TABLET | Refills: 98 | Status: SHIPPED | OUTPATIENT
Start: 2019-07-28 | End: 2020-07-28

## 2019-07-28 RX ORDER — FLUOXETINE 10 MG/1
CAPSULE ORAL
Qty: 28 CAPSULE | Refills: 98 | Status: SHIPPED | OUTPATIENT
Start: 2019-07-28 | End: 2020-07-29

## 2019-09-23 DIAGNOSIS — H40.9 GLAUCOMA, UNSPECIFIED GLAUCOMA TYPE, UNSPECIFIED LATERALITY: Primary | ICD-10-CM

## 2019-09-24 RX ORDER — BRINZOLAMIDE 10 MG/ML
SUSPENSION/ DROPS OPHTHALMIC
Qty: 10 ML | Refills: 97 | Status: SHIPPED | OUTPATIENT
Start: 2019-09-24 | End: 2020-11-03

## 2019-10-25 DIAGNOSIS — L85.3 DRY SKIN: Primary | ICD-10-CM

## 2019-10-25 RX ORDER — AMMONIUM LACTATE 12 G/100G
CREAM TOPICAL 2 TIMES DAILY PRN
Qty: 385 G | Refills: 3 | Status: SHIPPED | OUTPATIENT
Start: 2019-10-25 | End: 2020-11-03

## 2019-10-30 DIAGNOSIS — I10 BENIGN ESSENTIAL HYPERTENSION: ICD-10-CM

## 2019-10-30 DIAGNOSIS — M85.89 OSTEOPENIA OF MULTIPLE SITES: Primary | ICD-10-CM

## 2019-10-30 RX ORDER — CHOLECALCIFEROL (VITAMIN D3) 25 MCG
TABLET ORAL
Qty: 28 TABLET | Refills: 98 | Status: SHIPPED | OUTPATIENT
Start: 2019-10-30 | End: 2020-11-21

## 2019-10-30 RX ORDER — AMLODIPINE BESYLATE 5 MG/1
TABLET ORAL
Qty: 30 TABLET | Refills: 98 | Status: SHIPPED | OUTPATIENT
Start: 2019-10-30 | End: 2020-06-15

## 2020-01-09 NOTE — PROGRESS NOTES
[place] [facility]  Chief Complaint   Patient presents with     Annual Comprehensive Exam Assisted Living     [place] Medical Record Number:  [id number]  Place [facility] where encounter took place[facility] [[id number]]    HPI:    Slim Sams  is a 89 year old  (8/4/1924), who is being seen today for an annual comprehensive visit. HPI information obtained from: facility chart records, facility staff, patient report and [facility] chart review.  Today's concerns are:    Mixed Dementia. Resides in secure memory unit [facility]. Able to make needs known. Ambulates independently. Mini-Cog Assessment Score 1 on 6/25/18. SLUMS Score 2/30 on 11/13/19.  Weight 151.6 lbs on 8/20/19 -> 148 lbs on 1/14/20.      Adjustment Disorder with Anxiety. Per staff report, has increased agitation and anxiety in the evening. Makes frequent phone calls to family. Per review of documentation, no behaviors noted.      Hypertension/Hyperlipidemia. Upon review of blood pressure over the past month, systolic range from 124-152. Diastolic range from 61-65.     Glaucoma.  No recent reports of vision changes. Followed by Dr. Sary Calvlilo at [facility]. Has an allergy to Timolol, but remains on this eye drop.  Son and daughter in-law are aware of this.      Osteopenia. No reports of pain. Has had 1 fall over the past year on 11/7/19. Found sitting on bottom in hallway.    Bilateral Lower Extremity Edema. Denies shortness of breath.     ALLERGIES: Brimonidine and Timolol maleate [timolol]  PAST MEDICAL HISTORY:  has a past medical history of Adjustment disorder with anxiety, Allergic conjunctivitis, Astigmatism, Blepharitis, Dementia, Glaucoma, Hypercholesteremia, Hypertension, MRSA infection (2010), Osteopenia, Pseudophakia, and Varicella.  PAST SURGICAL HISTORY:  has a past surgical history that includes hysterectomy, leeann.  IMMUNIZATIONS:  Immunization History    Administered Date(s) Administered     DTaP, Unspecified 05/11/2010     Flu, Unspecified 10/15/1996     Influenza (High Dose) 3 valent vaccine 10/06/2010, 09/15/2011, 10/31/2013, 10/15/2014, 10/02/2015, 12/13/2016, 12/01/2017, 09/06/2018, 10/01/2019     Pneumo Conj 13-V (2010&after) 08/03/2015     Pneumococcal 23 valent 10/31/1995     Above immunizations pulled from [facility]. [facility] and facility records also reconciled. Outstanding information sent to  to update [facility].  Future immunizations are not needed at this point as all recommended immunizations are up to date.     Current Outpatient Medications   Medication Sig Dispense Refill     amLODIPine (NORVASC) 5 MG tablet TAKE 1 TABLET BY MOUTH ONCE DAILY 30 tablet 98     ammonium lactate (AMLACTIN) 12 % external cream Apply topically 2 times daily as needed for dry skin 385 g 3     AZOPT 1 % ophthalmic suspension INSTILL ONE DROP IN EACH EYE THREE TIMES DAILY 10 mL 97     calcium carbonate 600 mg-vitamin D 400 units (CALTRATE) 600-400 MG-UNIT per tablet TAKE 1 TABLET BY MOUTH TWICE DAILY 30 tablet 98     dorzolamide (TRUSOPT) 2 % ophthalmic solution 1 drop 3 times daily       FLUoxetine (PROZAC) 10 MG capsule TAKE 1 CAPSULE BY MOUTH ONCE DAILY ( TAKE WITH (1) 20MG CAP FOR A TOTAL DOSE OF 30MG) 28 capsule 98     FLUoxetine (PROZAC) 20 MG capsule TAKE 1 CAPSULE BY MOUTH ONCE DAILY ( TAKE WITH (1) 10MG CAP FOR A TOTAL DOSE OF 30MG) 28 capsule 98     MAGNESIUM OXIDE PO Take 400 mg by mouth daily       METOPROLOL SUCCINATE ER PO Take 25 mg by mouth daily       Multiple Vitamins-Minerals (CEROVITE SENIOR PO) Take 1 tablet by mouth daily        QUEtiapine (SEROQUEL) 25 MG tablet TAKE ONE-HALF TABLET (12.5MG) BY MOUTH EVERY NIGHT AT BEDTIME 14 tablet 98     spironolactone-hydrochlorothiazide (ALDACTAZIDE) 25-25 MG per tablet Take 0.5 tablets by mouth daily       timolol maleate (TIMOPTIC) 0.5 % ophthalmic solution INSTILL ONE DROP IN EACH EYE ONCE  DAILY 5 mL 97     travoprost, ASTRID Free, (TRAVATAN Z) 0.004 % ophthalmic solution 1 drop At Bedtime       VITAMIN D3 25 MCG (1000 UT) tablet TAKE 1 TABLET BY MOUTH ONCE DAILY 28 tablet 98     Case Management:  I have reviewed the Assisted Living care plan, current immunizations and preventive care/cancer screening. .Future cancer screening is not clinically indicated secondary to age/goals of care Patient's desire to return to the community is not assessible due to cognitive impairment. Current Level of Care is appropriate.    Advance Directive Discussion:    I reviewed the current advanced directives as reflected in EPIC, the POLST and the facility chart, and verified the congruency of orders.  I did not due to cognitive impairment review the advance directives with the resident.     Team Discussion:  I communicated with the appropriate disciplines involved with the Plan of Care:   Nursing    Patient's goal is pain control and comfort.  Information reviewed:  Medications, vital signs, orders, and nursing notes.    ROS:  Limited secondary to cognitive impairment but today pt reports no complaints    Vitals:  /65   Pulse 60   Temp 98.1  F (36.7  C)   Resp 16   Wt 67.1 kg (148 lb)  There is no height or weight on file to calculate BMI.  Exam:  GENERAL APPEARANCE:  Alert, in no distress  ENT:  Mouth and posterior oropharynx normal, moist mucous membranes  EYES:  EOM, conjunctivae, lids, pupils and irises normal  RESP:  respiratory effort and palpation of chest normal, lungs clear to auscultation , no respiratory distress  CV:  Palpation and auscultation of heart done , regular rate and rhythm, no murmur, rub, or gallop  ABDOMEN:  normal bowel sounds, soft, nontender, no hepatosplenomegaly or other masses  M/S:   Active movement of bilateral upper and lower extremities. 1+ edema in BLE.  SKIN:  Inspection of skin and subcutaneous tissue baseline, Palpation of skin and subcutaneous tissue  baseline  NEURO:   Cranial nerves 2-12 are normal tested and grossly at patient's baseline  PSYCH:  memory impaired     Lab/Diagnostic data:   Labs done in SNF are in [facility]. Please refer to them using [employer] Everywhere.    ASSESSMENT/PLAN  Mixed Dementia. Chronic, progressive. Requires 24 hour supervision. Resides in secure memory unit of AL. Able to make needs known. Ambulates independently. Mini-Cog Assessment Score 1 on 6/25/18. SLUMS Score 2/30 on 11/13/19. Unable to tolerate Namenda. Staff to assist with ADLs and meals.     Adjustment Disorder with Anxiety. Symptoms include palpitations and headaches. Previously on Mirtazapine and Lorazepam. Uncertain why and when Quetiapine was initiated, but appears to have been on since at least 2015. Quetiapine decreased on 1/11/19. Fluoxetine increased 6/25/18 due to increasing anxiety. Will follow-up with family to determine if medications need to be adjusted.      Hypertension/Hyperlipidemia. Monitor blood pressure monthly. Goal blood pressure <150/90 given age and co-morbidities. Continue Amlodipine, Metoprolol and Spironolactone-Hydrochlorothiazide as ordered. Will need to check lipid panel with next labs. Based on JNC-8 goals,  patients age of 89 year old, no presence of diabetes or CKD, and goals of care goal BP is <150/90 mm Hg. Patient is stable with current plan of care and routine assessment..     Glaucoma. Followed by Dr. Sary Calvillo at [facility]. Continue Dorzolamide, Timolol and Travatan as ordered.  Do not see Azopt on Ophthalmology note from 4/29/19. Will need to clarify if patient should be taking. Currently on AL orders.      Osteopenia. Continue Calcium and Vitamin D supplements as ordered.     Bilateral Lower Extremity Edema. Appears to be dependent edema. Amlodipine may also be contributing. Taking Spironolactone-Hydrochlorothiazide. Weights stable. No shortness of breath. Will discuss with family if they feel further treatment is  indicated.    ADDENDUM 1/22/20: Spoke to daughter in-law Jennifer and son. Elected to stop Amlodipine due to BLE edema. Will check blood pressure twice weekly x 2 weeks. Adjust treatment accordingly. Encouraged family to communicate if no improvement in edema.     Electronically signed by:  NIEVES Carranza CNP

## 2020-01-15 ENCOUNTER — ASSISTED LIVING VISIT (OUTPATIENT)
Dept: GERIATRICS | Facility: CLINIC | Age: 85
End: 2020-01-15
Payer: COMMERCIAL

## 2020-01-15 VITALS
DIASTOLIC BLOOD PRESSURE: 65 MMHG | WEIGHT: 148 LBS | RESPIRATION RATE: 16 BRPM | TEMPERATURE: 98.1 F | HEART RATE: 60 BPM | SYSTOLIC BLOOD PRESSURE: 124 MMHG

## 2020-01-15 DIAGNOSIS — Z00.00 ANNUAL PHYSICAL EXAM: Primary | ICD-10-CM

## 2020-01-15 DIAGNOSIS — M85.80 OSTEOPENIA, UNSPECIFIED LOCATION: ICD-10-CM

## 2020-01-15 DIAGNOSIS — F03.90 DEMENTIA WITHOUT BEHAVIORAL DISTURBANCE, UNSPECIFIED DEMENTIA TYPE: ICD-10-CM

## 2020-01-15 DIAGNOSIS — I10 BENIGN ESSENTIAL HYPERTENSION: ICD-10-CM

## 2020-01-15 DIAGNOSIS — R60.0 LOCALIZED EDEMA: ICD-10-CM

## 2020-01-15 DIAGNOSIS — H40.9 GLAUCOMA, UNSPECIFIED GLAUCOMA TYPE, UNSPECIFIED LATERALITY: ICD-10-CM

## 2020-01-15 DIAGNOSIS — E78.5 HYPERLIPIDEMIA, UNSPECIFIED HYPERLIPIDEMIA TYPE: ICD-10-CM

## 2020-01-15 DIAGNOSIS — F43.22 ADJUSTMENT DISORDER WITH ANXIOUS MOOD: ICD-10-CM

## 2020-01-15 NOTE — LETTER
1/15/2020        RE: Slim Sams  [facility]  [address] [facility] [address] [address]        [place] [facility]  Chief Complaint   Patient presents with     Annual Comprehensive Exam Assisted Living     [place] Medical Record Number:  [id number]  Place of Service where encounter took place:  [facility] ([facility]) [[id number]]    HPI:    Slim Sams  is a 89 year old  (8/4/1924), who is being seen today for an annual comprehensive visit. HPI information obtained from: facility chart records, facility staff, patient report and [facility] chart review.  Today's concerns are:    Mixed Dementia. Resides in secure memory unit of AL. Able to make needs known. Ambulates independently. Mini-Cog Assessment Score 1 on 6/25/18. SLUMS Score 2/30 on 11/13/19.  Weight 151.6 lbs on 8/20/19 -> 148 lbs on 1/14/20.      Adjustment Disorder with Anxiety. Per staff report, has increased agitation and anxiety in the evening. Makes frequent phone calls to family. Per review of documentation, no behaviors noted.      Hypertension/Hyperlipidemia. Upon review of blood pressure over the past month, systolic range from 124-152. Diastolic range from 61-65.     Glaucoma.  No recent reports of vision changes. Followed by Dr. Sary Calvillo at [facility]. Has an allergy to Timolol, but remains on this eye drop.  Son and daughter in-law are aware of this.      Osteopenia. No reports of pain. Has had 1 fall over the past year on 11/7/19. Found sitting on bottom in hallway.    Bilateral Lower Extremity Edema. Denies shortness of breath.     ALLERGIES: Brimonidine and Timolol maleate [timolol]  PAST MEDICAL HISTORY:  has a past medical history of Adjustment disorder with anxiety, Allergic conjunctivitis, Astigmatism, Blepharitis, Dementia, Glaucoma, Hypercholesteremia, Hypertension, MRSA infection (2010), Osteopenia, Pseudophakia, and Varicella.  PAST  SURGICAL HISTORY:  has a past surgical history that includes hysterectomy, leeann.  IMMUNIZATIONS:  Immunization History   Administered Date(s) Administered     DTaP, Unspecified 05/11/2010     Flu, Unspecified 10/15/1996     Influenza (High Dose) 3 valent vaccine 10/06/2010, 09/15/2011, 10/31/2013, 10/15/2014, 10/02/2015, 12/13/2016, 12/01/2017, 09/06/2018, 10/01/2019     Pneumo Conj 13-V (2010&after) 08/03/2015     Pneumococcal 23 valent 10/31/1995     Above immunizations pulled from [facility]. MI[facility] and facility records also reconciled. Outstanding information sent to  to update [facility].  Future immunizations are not needed at this point as all recommended immunizations are up to date.     Current Outpatient Medications   Medication Sig Dispense Refill     amLODIPine (NORVASC) 5 MG tablet TAKE 1 TABLET BY MOUTH ONCE DAILY 30 tablet 98     ammonium lactate (AMLACTIN) 12 % external cream Apply topically 2 times daily as needed for dry skin 385 g 3     AZOPT 1 % ophthalmic suspension INSTILL ONE DROP IN EACH EYE THREE TIMES DAILY 10 mL 97     calcium carbonate 600 mg-vitamin D 400 units (CALTRATE) 600-400 MG-UNIT per tablet TAKE 1 TABLET BY MOUTH TWICE DAILY 30 tablet 98     dorzolamide (TRUSOPT) 2 % ophthalmic solution 1 drop 3 times daily       FLUoxetine (PROZAC) 10 MG capsule TAKE 1 CAPSULE BY MOUTH ONCE DAILY ( TAKE WITH (1) 20MG CAP FOR A TOTAL DOSE OF 30MG) 28 capsule 98     FLUoxetine (PROZAC) 20 MG capsule TAKE 1 CAPSULE BY MOUTH ONCE DAILY ( TAKE WITH (1) 10MG CAP FOR A TOTAL DOSE OF 30MG) 28 capsule 98     MAGNESIUM OXIDE PO Take 400 mg by mouth daily       METOPROLOL SUCCINATE ER PO Take 25 mg by mouth daily       Multiple Vitamins-Minerals (CEROVITE SENIOR PO) Take 1 tablet by mouth daily        QUEtiapine (SEROQUEL) 25 MG tablet TAKE ONE-HALF TABLET (12.5MG) BY MOUTH EVERY NIGHT AT BEDTIME 14 tablet 98     spironolactone-hydrochlorothiazide (ALDACTAZIDE) 25-25 MG per tablet Take  0.5 tablets by mouth daily       timolol maleate (TIMOPTIC) 0.5 % ophthalmic solution INSTILL ONE DROP IN EACH EYE ONCE DAILY 5 mL 97     travoprost, ASTRID Free, (TRAVATAN Z) 0.004 % ophthalmic solution 1 drop At Bedtime       VITAMIN D3 25 MCG (1000 UT) tablet TAKE 1 TABLET BY MOUTH ONCE DAILY 28 tablet 98     Case Management:  I have reviewed the Assisted Living care plan, current immunizations and preventive care/cancer screening. .Future cancer screening is not clinically indicated secondary to age/goals of care Patient's desire to return to the community is not assessible due to cognitive impairment. Current Level of Care is appropriate.    Advance Directive Discussion:    I reviewed the current advanced directives as reflected in EPIC, the POLST and the facility chart, and verified the congruency of orders.  I did not due to cognitive impairment review the advance directives with the resident.     Team Discussion:  I communicated with the appropriate disciplines involved with the Plan of Care:   Nursing    Patient's goal is pain control and comfort.  Information reviewed:  Medications, vital signs, orders, and nursing notes.    ROS:  Limited secondary to cognitive impairment but today pt reports no complaints    Vitals:  /65   Pulse 60   Temp 98.1  F (36.7  C)   Resp 16   Wt 67.1 kg (148 lb)  There is no height or weight on file to calculate BMI.  Exam:  GENERAL APPEARANCE:  Alert, in no distress  ENT:  Mouth and posterior oropharynx normal, moist mucous membranes  EYES:  EOM, conjunctivae, lids, pupils and irises normal  RESP:  respiratory effort and palpation of chest normal, lungs clear to auscultation , no respiratory distress  CV:  Palpation and auscultation of heart done , regular rate and rhythm, no murmur, rub, or gallop  ABDOMEN:  normal bowel sounds, soft, nontender, no hepatosplenomegaly or other masses  M/S:   Active movement of bilateral upper and lower extremities. 1+ edema in  BLE.  SKIN:  Inspection of skin and subcutaneous tissue baseline, Palpation of skin and subcutaneous tissue baseline  NEURO:   Cranial nerves 2-12 are normal tested and grossly at patient's baseline  PSYCH:  memory impaired     Lab/Diagnostic data:   Labs done in SNF are in [facility]. Please refer to them using [employer] Everywhere.    ASSESSMENT/PLAN  Mixed Dementia. Chronic, progressive. Requires 24 hour supervision. Resides in secure memory unit of AL. Able to make needs known. Ambulates independently. Mini-Cog Assessment Score 1 on 6/25/18. SLUMS Score 2/30 on 11/13/19. Unable to tolerate Namenda. Staff to assist with ADLs and meals.     Adjustment Disorder with Anxiety. Symptoms include palpitations and headaches. Previously on Mirtazapine and Lorazepam. Uncertain why and when Quetiapine was initiated, but appears to have been on since at least 2015. Quetiapine decreased on 1/11/19. Fluoxetine increased 6/25/18 due to increasing anxiety.  Will follow-up with family to determine if medications need to be adjusted.      Hypertension/Hyperlipidemia. Monitor blood pressure monthly. Goal blood pressure <150/90 given age and co-morbidities. Continue Amlodipine, Metoprolol and Spironolactone-Hydrochlorothiazide as ordered. Will need to check lipid panel with next labs. Based on JNC-8 goals,  patients age of 89 year old, no presence of diabetes or CKD, and goals of care goal BP is <150/90 mm Hg. Patient is stable with current plan of care and routine assessment..     Glaucoma. Followed by Dr. Sary Calvillo at [facility]. Continue Dorzolamide, Timolol and Travatan as ordered.   Do not see Azopt on Ophthalmology note from 4/29/19. Will need to clarify if patient should be taking. Currently on AL orders.      Osteopenia. Continue Calcium and Vitamin D supplements as ordered.     Bilateral Lower Extremity Edema. Appears to be dependent edema. Amlodipine may also be contributing. Taking  Spironolactone-Hydrochlorothiazide. Weights stable. No shortness of breath. Will discuss with family if they feel further treatment is indicated.    Electronically signed by:  NIEVES Carranza CNP         Sincerely,        NIEVES Carranza CNP

## 2020-01-29 DIAGNOSIS — H40.9 GLAUCOMA, UNSPECIFIED GLAUCOMA TYPE, UNSPECIFIED LATERALITY: ICD-10-CM

## 2020-01-29 RX ORDER — TIMOLOL MALEATE 5 MG/ML
SOLUTION/ DROPS OPHTHALMIC
Qty: 5 ML | Refills: 10 | Status: SHIPPED | OUTPATIENT
Start: 2020-01-29 | End: 2021-01-01

## 2020-02-04 ENCOUNTER — RECORDS - HEALTHEAST (OUTPATIENT)
Dept: LAB | Facility: CLINIC | Age: 85
End: 2020-02-04

## 2020-02-04 ENCOUNTER — TRANSFERRED RECORDS (OUTPATIENT)
Dept: HEALTH INFORMATION MANAGEMENT | Facility: CLINIC | Age: 85
End: 2020-02-04

## 2020-02-04 LAB
ANION GAP SERPL CALCULATED.3IONS-SCNC: 10 MMOL/L (ref 5–18)
ANION GAP SERPL CALCULATED.3IONS-SCNC: 10 MMOL/L (ref 5–18)
BUN SERPL-MCNC: 18 MG/DL (ref 8–28)
BUN SERPL-MCNC: 18 MG/DL (ref 8–28)
CALCIUM SERPL-MCNC: 9.3 MG/DL (ref 8.5–10.5)
CALCIUM SERPL-MCNC: 9.3 MG/DL (ref 8.5–10.5)
CHLORIDE BLD-SCNC: 100 MMOL/L (ref 98–107)
CHLORIDE SERPLBLD-SCNC: 100 MMOL/L (ref 98–107)
CHOLEST SERPL-MCNC: 226 MG/DL
CHOLEST SERPL-MCNC: 226 MG/DL
CO2 SERPL-SCNC: 29 MMOL/L (ref 22–31)
CO2 SERPL-SCNC: 29 MMOL/L (ref 22–31)
CREAT SERPL-MCNC: 0.78 MG/DL (ref 0.6–1.1)
CREAT SERPL-MCNC: 0.78 MG/DL (ref 0.6–1.1)
ERYTHROCYTE [DISTWIDTH] IN BLOOD BY AUTOMATED COUNT: 14.3 % (ref 11–14.5)
ERYTHROCYTE [DISTWIDTH] IN BLOOD BY AUTOMATED COUNT: 14.3 % (ref 11–14.5)
FASTING STATUS PATIENT QL REPORTED: ABNORMAL
GFR SERPL CREATININE-BSD FRML MDRD: >60 ML/MIN/1.73M2
GFR SERPL CREATININE-BSD FRML MDRD: >60 ML/MIN/1.73M2
GLUCOSE BLD-MCNC: 92 MG/DL (ref 70–125)
GLUCOSE SERPL-MCNC: 92 MG/DL (ref 70–125)
HCT VFR BLD AUTO: 39.6 % (ref 35–47)
HCT VFR BLD AUTO: 39.6 % (ref 35–47)
HDLC SERPL-MCNC: 71 MG/DL
HDLC SERPL-MCNC: 71 MG/DL
HEMOGLOBIN: 12.3 G/DL (ref 12–16)
HGB BLD-MCNC: 12.3 G/DL (ref 12–16)
LDLC SERPL CALC-MCNC: 129 MG/DL
LDLC SERPL CALC-MCNC: 129 MG/DL
MCH RBC QN AUTO: 30.4 PG (ref 27–34)
MCH RBC QN AUTO: 30.4 PG (ref 27–34)
MCHC RBC AUTO-ENTMCNC: 31.1 G/DL (ref 32–36)
MCHC RBC AUTO-ENTMCNC: 31.1 G/DL (ref 32–36)
MCV RBC AUTO: 98 FL (ref 80–100)
MCV RBC AUTO: 98 FL (ref 80–100)
PLATELET # BLD AUTO: 202 THOU/UL (ref 140–440)
PLATELET # BLD AUTO: 202 THOU/UL (ref 140–440)
PMV BLD AUTO: 10.9 FL (ref 8.5–12.5)
POTASSIUM BLD-SCNC: 4.4 MMOL/L (ref 3.5–5)
POTASSIUM SERPL-SCNC: 4.4 MMOL/L (ref 3.5–5)
RBC # BLD AUTO: 4.04 MILL/UL (ref 3.8–5.4)
RBC # BLD AUTO: 4.04 MILL/UL (ref 3.8–5.4)
SODIUM SERPL-SCNC: 139 MMOL/L (ref 136–145)
SODIUM SERPL-SCNC: 139 MMOL/L (ref 136–145)
TRIGL SERPL-MCNC: 129 MG/DL
TRIGL SERPL-MCNC: 129 MG/DL
WBC # BLD AUTO: 6.7 THOU/UL (ref 4–11)
WBC: 6.7 THOU/UL (ref 4–11)

## 2020-02-05 LAB — 25(OH)D3 SERPL-MCNC: 33.8 NG/ML (ref 30–80)

## 2020-04-15 PROBLEM — E78.2 MIXED HYPERLIPIDEMIA: Status: ACTIVE | Noted: 2020-04-15

## 2020-05-07 ENCOUNTER — TRANSFERRED RECORDS (OUTPATIENT)
Dept: HEALTH INFORMATION MANAGEMENT | Facility: CLINIC | Age: 85
End: 2020-05-07

## 2020-05-18 ENCOUNTER — TRANSFERRED RECORDS (OUTPATIENT)
Dept: HEALTH INFORMATION MANAGEMENT | Facility: CLINIC | Age: 85
End: 2020-05-18

## 2020-06-04 ENCOUNTER — TELEPHONE (OUTPATIENT)
Dept: GERIATRICS | Facility: CLINIC | Age: 85
End: 2020-06-04

## 2020-06-04 NOTE — TELEPHONE ENCOUNTER
Spoke to daughter in-law Jennifer who feels patient is doing as well as she can be. Doesn't always understand why she can't visit neighbor across the last. Family members call every day in the morning, afternoon and evening to check in on patient.     Daughter in-law does not feel patient needs a virtual visit at this time.    Per review of documentation, patient was found on the floor in her apartment on 5/28/20. No pain. Heart rate 57, but all other VSS. Stated she just lost her footing. On 5/6/20, was found holding on to the door with 1 knee down. Reported she tripped. VSS.

## 2020-06-10 ENCOUNTER — ASSISTED LIVING VISIT (OUTPATIENT)
Dept: GERIATRICS | Facility: CLINIC | Age: 85
End: 2020-06-10
Payer: COMMERCIAL

## 2020-06-10 VITALS
SYSTOLIC BLOOD PRESSURE: 137 MMHG | HEART RATE: 57 BPM | RESPIRATION RATE: 16 BRPM | DIASTOLIC BLOOD PRESSURE: 75 MMHG | WEIGHT: 148 LBS | TEMPERATURE: 95 F

## 2020-06-10 DIAGNOSIS — I10 BENIGN ESSENTIAL HYPERTENSION: ICD-10-CM

## 2020-06-10 DIAGNOSIS — F03.91 DEMENTIA WITH BEHAVIORAL DISTURBANCE, UNSPECIFIED DEMENTIA TYPE: Primary | ICD-10-CM

## 2020-06-10 DIAGNOSIS — R29.6 RECURRENT FALLS: ICD-10-CM

## 2020-06-10 NOTE — LETTER
"    6/10/2020        RE: Slim Sams  [facility]  [address]  [facility] [address]        [place] [facility]  [place] Medical Record Number:  [id number]  Place of Service where encounter took place[facility] [place] [facility] ([facility]) [[id number]]  Chief Complaint   Patient presents with     RECHECK     HPI:    Slim Sams  is a 89 year old (8/4/1924), who is being seen today for an episodic care visit.  HPI information obtained from: facility chart records, facility staff, patient report, [facility] chart review and family/first contact daughter in-law Jennifer report. Today's concern is:    Dementia with Adjustment Disorder with Anxiety. Per staff report, patient has been \"ramping up in the evenings\". Per review documentation, on 5/7/20, looking around for a little girl. Per staff report following visit, has heard from other staff members starting around mid-to-late afternoon, becomes weepy and confused. Looks for father, brothers, children or new friends. Can be difficult to console and redirect. Stood at doorway, pounding to get out. Has swung her purse at staff and been combative. Staff member notes she was able to console patient last week while working an evening shift. During today's visit, patient has purse and states she is staying tonight, but planning to go home tomorrow. Spoke to daughter in-law, Jennifer, on 6/15/20, who reports her mother can get anxious in the evening. Family has adjusted their call schedule. Their latest call is in the afternoon, no longer in the evening.     Recurrent Falls. On 5/6/20, had a fall. Was holding onto the door with 1 knee down. Reported she tripped and fell. No injuries. VSS. On 5/29/20, found on floor of apartment. No injuries. Did not recall details of fall.    Hypertension. Upon review of blood pressure over the past month, single reading of 137/57.    Past Medical and Surgical History reviewed in Epic " today.    MEDICATIONS:  Current Outpatient Medications   Medication Sig Dispense Refill     amLODIPine (NORVASC) 5 MG tablet TAKE 1 TABLET BY MOUTH ONCE DAILY 30 tablet 98     ammonium lactate (AMLACTIN) 12 % external cream Apply topically 2 times daily as needed for dry skin 385 g 3     AZOPT 1 % ophthalmic suspension INSTILL ONE DROP IN EACH EYE THREE TIMES DAILY 10 mL 97     calcium carbonate 600 mg-vitamin D 400 units (CALTRATE) 600-400 MG-UNIT per tablet TAKE 1 TABLET BY MOUTH TWICE DAILY 30 tablet 98     dorzolamide (TRUSOPT) 2 % ophthalmic solution 1 drop 3 times daily       FLUoxetine (PROZAC) 10 MG capsule TAKE 1 CAPSULE BY MOUTH ONCE DAILY ( TAKE WITH (1) 20MG CAP FOR A TOTAL DOSE OF 30MG) 28 capsule 98     FLUoxetine (PROZAC) 20 MG capsule TAKE 1 CAPSULE BY MOUTH ONCE DAILY ( TAKE WITH (1) 10MG CAP FOR A TOTAL DOSE OF 30MG) 28 capsule 98     MAGNESIUM OXIDE PO Take 400 mg by mouth daily       METOPROLOL SUCCINATE ER PO Take 25 mg by mouth daily       Multiple Vitamins-Minerals (CEROVITE SENIOR PO) Take 1 tablet by mouth daily        QUEtiapine (SEROQUEL) 25 MG tablet TAKE ONE-HALF TABLET (12.5MG) BY MOUTH EVERY NIGHT AT BEDTIME 14 tablet 98     spironolactone-hydrochlorothiazide (ALDACTAZIDE) 25-25 MG per tablet Take 0.5 tablets by mouth daily       timolol maleate (TIMOPTIC) 0.5 % ophthalmic solution INSTILL ONE DROP IN EACH EYE ONCE DAILY 5 mL 10     travoprost, BAK Free, (TRAVATAN Z) 0.004 % ophthalmic solution 1 drop At Bedtime       VITAMIN D3 25 MCG (1000 UT) tablet TAKE 1 TABLET BY MOUTH ONCE DAILY 28 tablet 98     REVIEW OF SYSTEMS:  4 point ROS including Respiratory, CV, GI and , other than that noted in the HPI,  is negative    Objective:  /75   Pulse 57   Temp 95  F (35  C)   Resp 16   Wt 67.1 kg (148 lb)   Exam:  GENERAL APPEARANCE:  Alert, in no distress  ENT:  Mouth and posterior oropharynx normal, moist mucous membranes  EYES:  EOM, conjunctivae, lids, pupils and irises  normal  RESP:  no respiratory distress  PSYCH:  affect and mood normal    Labs:   Labs done in SNF are in [place] EPIC. Please refer to them using [facility] Everywhere.    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 Chronic, progressive. Requires 24 hour supervision. Resides in secure memory unit of AL. Able to make needs known. Ambulates independently. Mini-Cog Assessment Score 1 on 6/25/18. SLUMS Score 2/30 on 11/13/19. Unable to tolerate Namenda. Staff to assist with ADLs and meals. Given recent falls, hesitate to increase Quetiapine and risks of falls. Family has adjusted times they call patient in an effort to decrease behaviors from sundowning. Will ask staff to attempt to re-direct. If unable to, will look at increasing Quetiapine versus adding PRN dose. Also on Fluoxetine.    Recurrent Falls. On 5/6/20 in afternoon and 5/29/20 in the early evening. VSS although has had heart rate in the 50s 1 time in May, 1 time in April and 1 time in January. Will ask staff to monitor heart rate x 5 days to determine if Metoprolol should be adjusted as this could be contributing. Continue to monitor. May benefit from therapy for strengthening.    Hypertension. Single reading available to review. Continue Hydrochlorothiazide and Metoprolol as ordered for now. Will ask staff to monitor blood pressure more routinely to determine if adjustment should be made.    Electronically signed by:  NIEVES Carranza CNP             Sincerely,        NIEVES Carranza CNP

## 2020-06-11 NOTE — PROGRESS NOTES
"[place] [facility]  [place] Medical Record Number:  [id number]  Place of [facility] where encounter took place[facility] [[id number]]  Chief Complaint   Patient presents with     RECHECK     HPI:    Slim Sams  is a 89 year old (8/4/1924), who is being seen today for an episodic care visit.  HPI information obtained from: facility chart records, facility staff, patient report, [facility] chart review and family/first contact daughter in-law Jennifer report. Today's concern is:    Dementia with Adjustment Disorder with Anxiety. Per staff report, patient has been \"ramping up in the evenings\". Per review documentation, on 5/7/20, looking around for a little girl. Per staff report following visit, has heard from other staff members starting around mid-to-late afternoon, becomes weepy and confused. Looks for father, brothers, children or new friends. Can be difficult to console and redirect. Stood at doorway, pounding to get out. Has swung her purse at staff and been combative. Staff member notes she was able to console patient last week while working an evening shift. During today's visit, patient has purse and states she is staying tonight, but planning to go home tomorrow. Spoke to daughter in-law, Jennifer, on 6/15/20, who reports her mother can get anxious in the evening. Family has adjusted their call schedule. Their latest call is in the afternoon, no longer in the evening.     Recurrent Falls. On 5/6/20, had a fall. Was holding onto the door with 1 knee down. Reported she tripped and fell. No injuries. VSS. On 5/29/20, found on floor of apartment. No injuries. Did not recall details of fall.    Hypertension. Upon review of blood pressure over the past month, single reading of 137/57.    Past Medical and Surgical History reviewed in Epic today.    MEDICATIONS:  Current Outpatient Medications   Medication Sig Dispense Refill     amLODIPine (NORVASC) 5 MG " tablet TAKE 1 TABLET BY MOUTH ONCE DAILY 30 tablet 98     ammonium lactate (AMLACTIN) 12 % external cream Apply topically 2 times daily as needed for dry skin 385 g 3     AZOPT 1 % ophthalmic suspension INSTILL ONE DROP IN EACH EYE THREE TIMES DAILY 10 mL 97     calcium carbonate 600 mg-vitamin D 400 units (CALTRATE) 600-400 MG-UNIT per tablet TAKE 1 TABLET BY MOUTH TWICE DAILY 30 tablet 98     dorzolamide (TRUSOPT) 2 % ophthalmic solution 1 drop 3 times daily       FLUoxetine (PROZAC) 10 MG capsule TAKE 1 CAPSULE BY MOUTH ONCE DAILY ( TAKE WITH (1) 20MG CAP FOR A TOTAL DOSE OF 30MG) 28 capsule 98     FLUoxetine (PROZAC) 20 MG capsule TAKE 1 CAPSULE BY MOUTH ONCE DAILY ( TAKE WITH (1) 10MG CAP FOR A TOTAL DOSE OF 30MG) 28 capsule 98     MAGNESIUM OXIDE PO Take 400 mg by mouth daily       METOPROLOL SUCCINATE ER PO Take 25 mg by mouth daily       Multiple Vitamins-Minerals (CEROVITE SENIOR PO) Take 1 tablet by mouth daily        QUEtiapine (SEROQUEL) 25 MG tablet TAKE ONE-HALF TABLET (12.5MG) BY MOUTH EVERY NIGHT AT BEDTIME 14 tablet 98     spironolactone-hydrochlorothiazide (ALDACTAZIDE) 25-25 MG per tablet Take 0.5 tablets by mouth daily       timolol maleate (TIMOPTIC) 0.5 % ophthalmic solution INSTILL ONE DROP IN EACH EYE ONCE DAILY 5 mL 10     travoprost, BAK Free, (TRAVATAN Z) 0.004 % ophthalmic solution 1 drop At Bedtime       VITAMIN D3 25 MCG (1000 UT) tablet TAKE 1 TABLET BY MOUTH ONCE DAILY 28 tablet 98     REVIEW OF SYSTEMS:  4 point ROS including Respiratory, CV, GI and , other than that noted in the HPI,  is negative    Objective:  /75   Pulse 57   Temp 95  F (35  C)   Resp 16   Wt 67.1 kg (148 lb)   Exam:  GENERAL APPEARANCE:  Alert, in no distress  ENT:  Mouth and posterior oropharynx normal, moist mucous membranes  EYES:  EOM, conjunctivae, lids, pupils and irises normal  RESP:  no respiratory distress  PSYCH:  affect and mood normal    Labs:   Labs done in [facility] are in [place] EPIC.  Please refer to them using [employer]/Care Everywhere.    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 Chronic, progressive. Requires 24 hour supervision. Resides in secure memory unit of AL. Able to make needs known. Ambulates independently. Mini-Cog Assessment Score 1 on 6/25/18. SLUMS Score 2/30 on 11/13/19. Unable to tolerate Namenda. Staff to assist with ADLs and meals. Given recent falls, hesitate to increase Quetiapine and risks of falls. Will ask staff to administer earlier in the evening instead of bedtime. Family has adjusted times they call patient in an effort to decrease behaviors from sundowning. Will ask staff to attempt to re-direct. If unable to, will look at increasing Quetiapine versus adding PRN dose. Also on Fluoxetine.    Recurrent Falls. On 5/6/20 in afternoon and 5/29/20 in the early evening. VSS although has had heart rate in the 50s 1 time in May, 1 time in April and 1 time in January. Will ask staff to monitor heart rate x 5 days to determine if Metoprolol should be adjusted as this could be contributing. Continue to monitor. May benefit from therapy for strengthening.    Hypertension. Single reading available to review. Continue Hydrochlorothiazide and Metoprolol as ordered for now. Will ask staff to monitor blood pressure more routinely to determine if adjustment should be made.    Electronically signed by:  NIEVES Carranza CNP

## 2020-07-03 ENCOUNTER — RECORDS - HEALTHEAST (OUTPATIENT)
Dept: LAB | Facility: CLINIC | Age: 85
End: 2020-07-03

## 2020-07-08 ENCOUNTER — TELEPHONE (OUTPATIENT)
Dept: GERIATRICS | Facility: CLINIC | Age: 85
End: 2020-07-08

## 2020-07-08 DIAGNOSIS — R29.6 RECURRENT FALLS: Primary | ICD-10-CM

## 2020-07-09 NOTE — TELEPHONE ENCOUNTER
[place] [facility] TELEPHONE ENCOUNTER    Chief Complaint   Patient presents with     Patient Request     Slim Sams is a 89 year old  (8/4/1924),Nurse called today to report: Family and staff are requesting PT/OT for recurrent falls    ASSESSMENT/PLAN  Recurrent Falls    Ok for [facility] PT/OT    Electronically signed by:   NIEVES Carranza CNP

## 2020-07-10 LAB
SARS-COV-2 BY NAA - HISTORICAL: NOT DETECTED
SARS-COV-2 SOURCE - HISTORICAL: NORMAL

## 2020-07-16 ENCOUNTER — TELEPHONE (OUTPATIENT)
Dept: GERIATRICS | Facility: CLINIC | Age: 85
End: 2020-07-16

## 2020-07-16 NOTE — TELEPHONE ENCOUNTER
[facility] now requests orders and shares plan of care/discharge summaries for some patients through [employer].  Please REPLY TO THIS MESSAGE OR ROUTE BACK TO THE AUTHOR in order to give authorization for orders when needed.  This is considered a verbal order, you will still receive a faxed copy of orders for signature.  Thank you for your assistance in improving collaboration for our patients.    ORDER skilled PT intervention to start 7/19/2020 at 2w3, 1w1 for strength, gait, and balance training    Add OT lymph eval and treat to address B LE edema and compression needs    Karma Ivan DPT, CLT  [email or web address]

## 2020-07-24 ENCOUNTER — TELEPHONE (OUTPATIENT)
Dept: GERIATRICS | Facility: CLINIC | Age: 85
End: 2020-07-24

## 2020-07-24 DIAGNOSIS — F03.90 DEMENTIA WITHOUT BEHAVIORAL DISTURBANCE, UNSPECIFIED DEMENTIA TYPE: ICD-10-CM

## 2020-07-28 DIAGNOSIS — F32.A DEPRESSION, UNSPECIFIED DEPRESSION TYPE: ICD-10-CM

## 2020-07-28 RX ORDER — QUETIAPINE FUMARATE 25 MG/1
12.5 TABLET, FILM COATED ORAL DAILY
Qty: 14 TABLET | Refills: 98
Start: 2020-07-28 | End: 2020-08-26

## 2020-07-29 RX ORDER — FLUOXETINE 10 MG/1
CAPSULE ORAL
Qty: 28 CAPSULE | Refills: 97 | Status: SHIPPED | OUTPATIENT
Start: 2020-07-29 | End: 2021-01-01

## 2020-07-29 NOTE — TELEPHONE ENCOUNTER
[place] [facility] TELEPHONE ENCOUNTER    No chief complaint on file.    Slim Sams is a 89 year old  (8/4/1924),Nurse called today to report: Staff is requesting Quetiapine PRN as patient's behaviors increase in the evening at times. Behaviors in the evening have improved since changing timing of Quetiapine from at bedtime to dinner time. Behaviors including exit seeking, being combative with staff, inconsolable.     Staff at facility contacted daughter in-law who was in agreement with plan for Quetiapine PRN.    ASSESSMENT/PLAN  Dementia with Adjustment Disorder and Anxiety    Ok for Quetiapine 12.5 mg PO every day PRN in addition to Quetiapine 12.5 mg PO every day in the evening    Electronically signed by:   NIEVES Carranza CNP

## 2020-08-26 DIAGNOSIS — F03.90 DEMENTIA WITHOUT BEHAVIORAL DISTURBANCE, UNSPECIFIED DEMENTIA TYPE: ICD-10-CM

## 2020-08-26 RX ORDER — QUETIAPINE FUMARATE 25 MG/1
TABLET, FILM COATED ORAL
Qty: 31 TABLET | Refills: 97 | Status: SHIPPED | OUTPATIENT
Start: 2020-08-26 | End: 2020-09-11

## 2020-09-05 ENCOUNTER — TELEPHONE (OUTPATIENT)
Dept: GERIATRICS | Facility: CLINIC | Age: 85
End: 2020-09-05

## 2020-09-05 NOTE — TELEPHONE ENCOUNTER
Patient exit seeking, attempts to elope, looking for family, mild distress, will push/hit to get to doorway, unable to redirect, has had prior seroquel PRN doses claimed to be effective.  -change seroquel from Qday+PRN to 12.5mg BID with no PRN

## 2020-09-09 ENCOUNTER — ASSISTED LIVING VISIT (OUTPATIENT)
Dept: GERIATRICS | Facility: CLINIC | Age: 85
End: 2020-09-09
Payer: COMMERCIAL

## 2020-09-09 VITALS
DIASTOLIC BLOOD PRESSURE: 48 MMHG | HEART RATE: 85 BPM | SYSTOLIC BLOOD PRESSURE: 112 MMHG | RESPIRATION RATE: 18 BRPM | TEMPERATURE: 97.3 F | WEIGHT: 141.4 LBS

## 2020-09-09 DIAGNOSIS — F03.90 DEMENTIA WITHOUT BEHAVIORAL DISTURBANCE, UNSPECIFIED DEMENTIA TYPE: ICD-10-CM

## 2020-09-09 NOTE — LETTER
9/9/2020        RE: Slim Sams  [facility]  [address] [address] [facility] [address]        [place] [facility]  [place] Medical Record Number:  [id number]  [facility] [[id number]]  Chief Complaint   Patient presents with     RECHECK     HPI:    Slim Sams  is a 89 year old (8/4/1924), who is being seen today for an episodic care visit.  HPI information obtained from: facility chart records, facility staff, patient report, [facility] chart review and family/first contact daughter in-law Jennifer and son Spenser report. Today's concern is:    Dementia with Behavioral Disturbance. On 6/16/20, Quetiapine time changed to dinner time as patient was more anxious in the evening. On 7/24/20, Quetiapine PRN added due to increased behaviors. On-call updated on 9/5/20 regarding patient attempting to elope. Was in search of family and in mild distress. Pushing and hitting to get to the doorway. Unable to be redirected. Quetiapine was changed to BID dosing. Today, patient has no concerns. Per staff report, seems medication wears off too early. Per daughter in-law and son's report, has these behaviors often. Family coordinates the times they call her and call her frequently. Forgets when she talks to family and looks for them at the facility. Daughter in-law and son are comfortable with any medications required to make her less anxious.     Hypertension. Limited blood pressures available to review over the past month, but 112/48 on 8/20/20.     Lower Extremity Edema. Patient feels her legs have always been big. Is not concerned about them. No shortness of breath. Upon review of weights, 151.6 lbs on 8/20/19 -> 156.8 lbs on 12/20/19 -> 141.4 lbs on 8/20/20.     Past Medical and Surgical History reviewed in Epic today.    MEDICATIONS:  Current Outpatient Medications   Medication Sig Dispense Refill      QUEtiapine (SEROQUEL) 25 MG tablet Take 0.5 tablets (12.5 mg) by mouth 2 times daily 31 tablet 97     ammonium lactate (AMLACTIN) 12 % external cream Apply topically 2 times daily as needed for dry skin 385 g 3     AZOPT 1 % ophthalmic suspension INSTILL ONE DROP IN EACH EYE THREE TIMES DAILY 10 mL 97     calcium carbonate 600 mg-vitamin D 400 units (CALTRATE) 600-400 MG-UNIT per tablet TAKE 1 TABLET BY MOUTH TWICE DAILY 30 tablet 98     dorzolamide (TRUSOPT) 2 % ophthalmic solution 1 drop 3 times daily       FLUoxetine (PROZAC) 10 MG capsule TAKE 1 CAPSULE BY MOUTH ONCE DAILY ( TAKE WITH 20MG FOR A TOTAL DOSE OF 30MG) 28 capsule 97     FLUoxetine (PROZAC) 20 MG capsule TAKE 1 CAPSULE BY MOUTH ONCE DAILY ( TAKE WITH 10MG FOR A TOTAL DOSE OF 30MG) 28 capsule 97     MAGNESIUM OXIDE PO Take 400 mg by mouth daily       METOPROLOL SUCCINATE ER PO Take 25 mg by mouth daily       Multiple Vitamins-Minerals (CEROVITE SENIOR PO) Take 1 tablet by mouth daily        spironolactone-hydrochlorothiazide (ALDACTAZIDE) 25-25 MG per tablet Take 0.5 tablets by mouth daily       timolol maleate (TIMOPTIC) 0.5 % ophthalmic solution INSTILL ONE DROP IN EACH EYE ONCE DAILY 5 mL 10     travoprost, BAK Free, (TRAVATAN Z) 0.004 % ophthalmic solution 1 drop At Bedtime       VITAMIN D3 25 MCG (1000 UT) tablet TAKE 1 TABLET BY MOUTH ONCE DAILY 28 tablet 98     REVIEW OF SYSTEMS:  Limited secondary to cognitive impairment but today pt reports no concerns    Objective:  /48   Pulse 85   Temp 97.3  F (36.3  C)   Resp 18   Wt 64.1 kg (141 lb 6.4 oz)   Exam Limited due to COVID-19 Pandemic:  GENERAL APPEARANCE:  Alert, in no distress  ENT:  Mouth and posterior oropharynx normal, moist mucous membranes  EYES:  EOM, conjunctivae, lids, pupils and irises normal  RESP:  no respiratory distress  PSYCH:  affect and mood normal    Labs:   Labs done in SNF are in [place] EPIC. Please refer to them using [employer]/Care  Everywhere.    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 Chronic, progressive. Requires 24 hour supervision. Resides in secure memory unit of AL. Able to make needs known. Ambulates independently. Mini-Cog Assessment Score 1 on 6/25/18. SLUMS Score 2/30 on 11/13/19. Unable to tolerate Namenda. Staff to assist with ADLs and meals. Noted to have falls earlier in the year so Quetiapine was not increased, but as patient has not had a recent fall and behaviors have increased which includes exit seeking with aggressive behaviors, will continue Quetiapine BID as ordered by on-call. Also on Fluoxetine.    Hypertension. Single recent reading available to review which was soft. Continue Spironolactone-Hydrochlorothiazide and Metoprolol as ordered for now. Continue to monitor blood pressure monthly. If blood pressures consistently soft, consider decrease/discontinuation of medications.     Lower Extremity Edema. At baseline. Recently discharged from Occupational Therapy who performed lymphedema treatment. Wears compression stockings. Takes Spironolactone-Hydrochlorothiazide. Encourage elevation.     Electronically signed by:  NIEVES Carranza CNP            Sincerely,        NIEVES Carranza CNP

## 2020-09-09 NOTE — PROGRESS NOTES
[place] [facility]  [place] Medical Record Number:  [id number]  Place of Service where encounter took place:  [facility] [place] [facility] [[id number]]  Chief Complaint   Patient presents with     RECHECK     HPI:    Slim Sams  is a 89 year old (8/4/1924), who is being seen today for an episodic care visit.  HPI information obtained from: facility chart records, facility staff, patient report, [facility] chart review and family/first contact daughter in-law Jennifer and son Spenser report. Today's concern is:    Dementia with Behavioral Disturbance. On 6/16/20, Quetiapine time changed to dinner time as patient was more anxious in the evening. On 7/24/20, Quetiapine PRN added due to increased behaviors. On-call updated on 9/5/20 regarding patient attempting to elope. Was in search of family and in mild distress. Pushing and hitting to get to the doorway. Unable to be redirected. Quetiapine was changed to BID dosing. Today, patient has no concerns. Per staff report, seems medication wears off too early. Per daughter in-law and son's report, has these behaviors often. Family coordinates the times they call her and call her frequently. Forgets when she talks to family and looks for them at the facility. Daughter in-law and son are comfortable with any medications required to make her less anxious.     Hypertension. Limited blood pressures available to review over the past month, but 112/48 on 8/20/20.     Lower Extremity Edema. Patient feels her legs have always been big. Is not concerned about them. No shortness of breath. Upon review of weights, 151.6 lbs on 8/20/19 -> 156.8 lbs on 12/20/19 -> 141.4 lbs on 8/20/20.     Past Medical and Surgical History reviewed in Epic today.    MEDICATIONS:  Current Outpatient Medications   Medication Sig Dispense Refill     QUEtiapine (SEROQUEL) 25 MG tablet Take 0.5 tablets (12.5 mg) by mouth 2 times daily 31 tablet 97     ammonium lactate  (AMLACTIN) 12 % external cream Apply topically 2 times daily as needed for dry skin 385 g 3     AZOPT 1 % ophthalmic suspension INSTILL ONE DROP IN EACH EYE THREE TIMES DAILY 10 mL 97     calcium carbonate 600 mg-vitamin D 400 units (CALTRATE) 600-400 MG-UNIT per tablet TAKE 1 TABLET BY MOUTH TWICE DAILY 30 tablet 98     dorzolamide (TRUSOPT) 2 % ophthalmic solution 1 drop 3 times daily       FLUoxetine (PROZAC) 10 MG capsule TAKE 1 CAPSULE BY MOUTH ONCE DAILY ( TAKE WITH 20MG FOR A TOTAL DOSE OF 30MG) 28 capsule 97     FLUoxetine (PROZAC) 20 MG capsule TAKE 1 CAPSULE BY MOUTH ONCE DAILY ( TAKE WITH 10MG FOR A TOTAL DOSE OF 30MG) 28 capsule 97     MAGNESIUM OXIDE PO Take 400 mg by mouth daily       METOPROLOL SUCCINATE ER PO Take 25 mg by mouth daily       Multiple Vitamins-Minerals (CEROVITE SENIOR PO) Take 1 tablet by mouth daily        spironolactone-hydrochlorothiazide (ALDACTAZIDE) 25-25 MG per tablet Take 0.5 tablets by mouth daily       timolol maleate (TIMOPTIC) 0.5 % ophthalmic solution INSTILL ONE DROP IN EACH EYE ONCE DAILY 5 mL 10     travoprost, BAK Free, (TRAVATAN Z) 0.004 % ophthalmic solution 1 drop At Bedtime       VITAMIN D3 25 MCG (1000 UT) tablet TAKE 1 TABLET BY MOUTH ONCE DAILY 28 tablet 98     REVIEW OF SYSTEMS:  Limited secondary to cognitive impairment but today pt reports no concerns    Objective:  /48   Pulse 85   Temp 97.3  F (36.3  C)   Resp 18   Wt 64.1 kg (141 lb 6.4 oz)   Exam Limited due to COVID-19 Pandemic:  GENERAL APPEARANCE:  Alert, in no distress  ENT:  Mouth and posterior oropharynx normal, moist mucous membranes  EYES:  EOM, conjunctivae, lids, pupils and irises normal  RESP:  no respiratory distress  PSYCH:  affect and mood normal    Labs:   Labs done in SNF are in [place] EPIC. Please refer to them using [employer]Care Everywhere.    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 Chronic, progressive. Requires 24 hour supervision. Resides  in secure memory unit of AL. Able to make needs known. Ambulates independently. Mini-Cog Assessment Score 1 on 6/25/18. SLUMS Score 2/30 on 11/13/19. Unable to tolerate Namenda. Staff to assist with ADLs and meals. Noted to have falls earlier in the year so Quetiapine was not increased, but as patient has not had a recent fall and behaviors have increased which includes exit seeking with aggressive behaviors, will continue Quetiapine BID as ordered by on-call. Also on Fluoxetine.    Hypertension. Single recent reading available to review which was soft. Continue Spironolactone-Hydrochlorothiazide and Metoprolol as ordered for now. Continue to monitor blood pressure monthly. If blood pressures consistently soft, consider decrease/discontinuation of medications.     Lower Extremity Edema. At baseline. Recently discharged from Occupational Therapy who performed lymphedema treatment. Wears compression stockings. Takes Spironolactone-Hydrochlorothiazide. Encourage elevation.     Electronically signed by:  NIEVES Carranza CNP

## 2020-09-11 RX ORDER — QUETIAPINE FUMARATE 25 MG/1
12.5 TABLET, FILM COATED ORAL 2 TIMES DAILY
Qty: 31 TABLET | Refills: 97
Start: 2020-09-11 | End: 2020-10-19

## 2020-10-19 DIAGNOSIS — F03.90 DEMENTIA WITHOUT BEHAVIORAL DISTURBANCE, UNSPECIFIED DEMENTIA TYPE: ICD-10-CM

## 2020-10-19 DIAGNOSIS — I10 ESSENTIAL HYPERTENSION: Primary | ICD-10-CM

## 2020-10-19 RX ORDER — QUETIAPINE FUMARATE 25 MG/1
TABLET, FILM COATED ORAL
Qty: 28 TABLET | Refills: 97 | Status: SHIPPED | OUTPATIENT
Start: 2020-10-19 | End: 2020-11-03

## 2020-10-19 RX ORDER — MULTIVIT-MIN/FA/LYCOPEN/LUTEIN .4-300-25
TABLET ORAL
Qty: 28 TABLET | Refills: 97 | Status: SHIPPED | OUTPATIENT
Start: 2020-10-19

## 2020-10-19 RX ORDER — METOPROLOL SUCCINATE 25 MG/1
TABLET, EXTENDED RELEASE ORAL
Qty: 28 TABLET | Refills: 97 | Status: SHIPPED | OUTPATIENT
Start: 2020-10-19

## 2020-11-02 ENCOUNTER — ASSISTED LIVING VISIT (OUTPATIENT)
Dept: GERIATRICS | Facility: CLINIC | Age: 85
End: 2020-11-02
Payer: COMMERCIAL

## 2020-11-02 VITALS — WEIGHT: 141.4 LBS | BODY MASS INDEX: 26.02 KG/M2 | TEMPERATURE: 97.3 F | HEIGHT: 62 IN

## 2020-11-02 DIAGNOSIS — R63.4 WEIGHT LOSS: ICD-10-CM

## 2020-11-02 DIAGNOSIS — I10 ESSENTIAL HYPERTENSION: ICD-10-CM

## 2020-11-02 DIAGNOSIS — F03.90 DEMENTIA WITHOUT BEHAVIORAL DISTURBANCE, UNSPECIFIED DEMENTIA TYPE: Primary | ICD-10-CM

## 2020-11-02 ASSESSMENT — MIFFLIN-ST. JEOR: SCORE: 984.77

## 2020-11-02 NOTE — LETTER
11/2/2020        RE: Slim Sams  [facility]  [address] [address] [facility] [address]        [place] [facility]  [place] Medical Record Number:  [id number]  [facility]  [facility] [place] [facility] [[id number]]  Chief Complaint   Patient presents with     RECHECK     HPI:    Slim Sams  is a 89 year old (8/4/1924), who is being seen today for an episodic care visit.  HPI information obtained from: facility chart records, facility staff, patient report, [facility] chart review and family/first contact daughter in-law Jennifer report. Today's concern is:    Dementia with Behavioral Disturbance. Per staff report, patient is becoming more agitated starting at lunch and into the evening. Previous behaviors include patient attempting to elope in search of family, pushing and hitting staff to get to doorway. Per daughter in-law's report, patient seems to have more paranoid behavior like someone is trying to get into her apartment. Multiple family members contact patient throughout the day, but patient does not remember who they are. Talks about her parents and thinks they are still alive. Daughter in-law reports it is sad patient has to live like this as it is no quality of life.    Weight Loss. Upon review of documentation, weight 139 lbs on 10/29/18 -> 151.6 lbs on 9/20/19 -> 156.8 lbs on 12/20/19 -> 148.4 lbs on 6/20/20 -> 138.8 lbs on 10/20/20.     Hypertension. Limited blood pressures available to review. Most recent readings 150/63 on 10/20/20 and 112/48 on 8/20/20.    Past Medical and Surgical History reviewed in Epic today.    MEDICATIONS:  Current Outpatient Medications   Medication Sig Dispense Refill     ammonium lactate (AMLACTIN) 12 % external cream Apply topically 2 times daily as needed for dry skin 385 g 3     AZOPT 1 % ophthalmic suspension INSTILL ONE DROP IN EACH EYE THREE TIMES DAILY 10 mL 97      "calcium carbonate 600 mg-vitamin D 400 units (CALTRATE) 600-400 MG-UNIT per tablet TAKE 1 TABLET BY MOUTH TWICE DAILY 30 tablet 98     dorzolamide (TRUSOPT) 2 % ophthalmic solution 1 drop 3 times daily       FLUoxetine (PROZAC) 10 MG capsule TAKE 1 CAPSULE BY MOUTH ONCE DAILY ( TAKE WITH 20MG FOR A TOTAL DOSE OF 30MG) 28 capsule 97     FLUoxetine (PROZAC) 20 MG capsule TAKE 1 CAPSULE BY MOUTH ONCE DAILY ( TAKE WITH 10MG FOR A TOTAL DOSE OF 30MG) 28 capsule 97     MAGNESIUM OXIDE PO Take 400 mg by mouth daily       metoprolol succinate ER (TOPROL-XL) 25 MG 24 hr tablet TAKE 1 TABLET BY MOUTH ONCE DAILY 28 tablet 97     METOPROLOL SUCCINATE ER PO Take 25 mg by mouth daily       Multiple Vitamins-Minerals (CEROVITE SENIOR PO) Take 1 tablet by mouth daily        Multiple Vitamins-Minerals (CEROVITE SENIOR) TABS TAKE 1 TABLET BY MOUTH ONCE DAILY 28 tablet 97     QUEtiapine (SEROQUEL) 25 MG tablet TAKE ONE-HALF TABLET (12.5MG) BY MOUTH TWICE DAILY 28 tablet 97     spironolactone-hydrochlorothiazide (ALDACTAZIDE) 25-25 MG per tablet Take 0.5 tablets by mouth daily       timolol maleate (TIMOPTIC) 0.5 % ophthalmic solution INSTILL ONE DROP IN EACH EYE ONCE DAILY 5 mL 10     travoprost, ASTRID Free, (TRAVATAN Z) 0.004 % ophthalmic solution 1 drop At Bedtime       VITAMIN D3 25 MCG (1000 UT) tablet TAKE 1 TABLET BY MOUTH ONCE DAILY 28 tablet 98     REVIEW OF SYSTEMS:  Limited secondary to cognitive impairment but today pt reports no concerns    Objective:  Temp 97.3  F (36.3  C)   Ht 1.575 m (5' 2.01\")   Wt 64.1 kg (141 lb 6.4 oz)   BMI 25.86 kg/m     Exam Limited due to COVID-19 Pandemic:  GENERAL APPEARANCE:  Alert, in no distress  ENT:  Mouth and posterior oropharynx normal, moist mucous membranes  EYES:  EOM, conjunctivae, lids, pupils and irises normal  RESP:  no respiratory distress  PSYCH:  memory impaired     Labs:   Labs done in SNF are in [place] EPIC. Please refer to them using [employer]IC/Care " Everywhere.    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 Chronic, progressive. Requires 24 hour supervision. Resides in secure memory unit of AL. Able to make needs known. Ambulates independently. Mini-Cog Assessment Score 1 on 6/25/18. SLUMS Score 2/30 on 11/13/19. Unable to tolerate Namenda. Staff to assist with ADLs and meals. Noted to have falls earlier in the year so Quetiapine was not increased, but as patient has not had a recent fall and behaviors have increased which includes exit seeking with aggressive behaviors and paranoia, will change Quetiapine to 12.5 mg BID and every day PRN. Also on Fluoxetine. Daughter in-law in agreement.    Weight Loss. Likely related to above. Discussed indications of overall decline with daughter in-law. Reviewed criteria for hospice enrollment and goals of care. Daughter in-law would be in favor of hospice enrollment as she and her  feel patient no longer has a good quality of life. Will check weight this month and if consistent with most recent weight loss, will place hospice referral after discussion with daughter in-law. Agreed to check back in the coming weeks.     Hypertension. Limited readings available to review. Continue Spironolactone-Hydrochlorothiazide and Metoprolol as ordered for now. Continue to monitor blood pressure monthly.    Orders communicated by provider to facility via [facility]  -Add Quetiapine 12.5 mg PO every day PRN to Quetiapine 12.5 mg PO BID     Electronically signed by:  NIEVES Carranza CNP              Sincerely,        NIEVES Carranza CNP

## 2020-11-02 NOTE — PROGRESS NOTES
[place] [facility]  [place] Medical Record Number:  [id number]  Place of Service where encounter took place:  [facility] ([facility]) [[id number]]  Chief Complaint   Patient presents with     RECHECK     HPI:    Slim Sams  is a 89 year old (8/4/1924), who is being seen today for an episodic care visit.  HPI information obtained from: facility chart records, facility staff, patient report, [facility] chart review and family/first contact daughter in-law Jennifer report. Today's concern is:    Dementia with Behavioral Disturbance. Per staff report, patient is becoming more agitated starting at lunch and into the evening. Previous behaviors include patient attempting to elope in search of family, pushing and hitting staff to get to doorway. Per daughter in-law's report, patient seems to have more paranoid behavior like someone is trying to get into her apartment. Multiple family members contact patient throughout the day, but patient does not remember who they are. Talks about her parents and thinks they are still alive. Daughter in-law reports it is sad patient has to live like this as it is no quality of life.    Weight Loss. Upon review of documentation, weight 139 lbs on 10/29/18 -> 151.6 lbs on 9/20/19 -> 156.8 lbs on 12/20/19 -> 148.4 lbs on 6/20/20 -> 138.8 lbs on 10/20/20.     Hypertension. Limited blood pressures available to review. Most recent readings 150/63 on 10/20/20 and 112/48 on 8/20/20.    Past Medical and Surgical History reviewed in Epic today.    MEDICATIONS:  Current Outpatient Medications   Medication Sig Dispense Refill     ammonium lactate (AMLACTIN) 12 % external cream Apply topically 2 times daily as needed for dry skin 385 g 3     AZOPT 1 % ophthalmic suspension INSTILL ONE DROP IN EACH EYE THREE TIMES DAILY 10 mL 97     calcium carbonate 600 mg-vitamin D 400 units (CALTRATE) 600-400 MG-UNIT per tablet TAKE 1 TABLET BY MOUTH TWICE DAILY 30  "tablet 98     dorzolamide (TRUSOPT) 2 % ophthalmic solution 1 drop 3 times daily       FLUoxetine (PROZAC) 10 MG capsule TAKE 1 CAPSULE BY MOUTH ONCE DAILY ( TAKE WITH 20MG FOR A TOTAL DOSE OF 30MG) 28 capsule 97     FLUoxetine (PROZAC) 20 MG capsule TAKE 1 CAPSULE BY MOUTH ONCE DAILY ( TAKE WITH 10MG FOR A TOTAL DOSE OF 30MG) 28 capsule 97     MAGNESIUM OXIDE PO Take 400 mg by mouth daily       metoprolol succinate ER (TOPROL-XL) 25 MG 24 hr tablet TAKE 1 TABLET BY MOUTH ONCE DAILY 28 tablet 97     METOPROLOL SUCCINATE ER PO Take 25 mg by mouth daily       Multiple Vitamins-Minerals (CEROVITE SENIOR PO) Take 1 tablet by mouth daily        Multiple Vitamins-Minerals (CEROVITE SENIOR) TABS TAKE 1 TABLET BY MOUTH ONCE DAILY 28 tablet 97     QUEtiapine (SEROQUEL) 25 MG tablet TAKE ONE-HALF TABLET (12.5MG) BY MOUTH TWICE DAILY 28 tablet 97     spironolactone-hydrochlorothiazide (ALDACTAZIDE) 25-25 MG per tablet Take 0.5 tablets by mouth daily       timolol maleate (TIMOPTIC) 0.5 % ophthalmic solution INSTILL ONE DROP IN EACH EYE ONCE DAILY 5 mL 10     travoprost, ASTRID Free, (TRAVATAN Z) 0.004 % ophthalmic solution 1 drop At Bedtime       VITAMIN D3 25 MCG (1000 UT) tablet TAKE 1 TABLET BY MOUTH ONCE DAILY 28 tablet 98     REVIEW OF SYSTEMS:  Limited secondary to cognitive impairment but today pt reports no concerns    Objective:  Temp 97.3  F (36.3  C)   Ht 1.575 m (5' 2.01\")   Wt 64.1 kg (141 lb 6.4 oz)   BMI 25.86 kg/m     Exam Limited due to COVID-19 Pandemic:  GENERAL APPEARANCE:  Alert, in no distress  ENT:  Mouth and posterior oropharynx normal, moist mucous membranes  EYES:  EOM, conjunctivae, lids, pupils and irises normal  RESP:  no respiratory distress  PSYCH:  memory impaired     Labs:   Labs done in SNF are in [place] EPIC. Please refer to them using [employer]/Care Everywhere.    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 Chronic, progressive. Requires 24 hour supervision. " Resides in [facility] memory unit [facility]. Able to make needs known. Ambulates independently. Mini-Cog Assessment Score 1 on 6/25/18. SLUMS Score 2/30 on 11/13/19. Unable to tolerate Namenda. Staff to assist with ADLs and meals. Noted to have falls earlier in the year so Quetiapine was not increased, but as patient has not had a recent fall and behaviors have increased which includes exit seeking with aggressive behaviors and paranoia, will change Quetiapine to 12.5 mg BID and every day PRN. Also on Fluoxetine. Daughter in-law in agreement.    Weight Loss. Likely related to above. Discussed indications of overall decline with daughter in-law. Reviewed criteria for hospice enrollment and goals of care. Daughter in-law would be in favor of hospice enrollment as she and her  feel patient no longer has a good quality of life. Will check weight this month and if consistent with most recent weight loss, will place hospice referral after discussion with daughter in-law. Agreed to check back in the coming weeks.     Hypertension. Limited readings available to review. Continue Spironolactone-Hydrochlorothiazide and Metoprolol as ordered for now. Continue to monitor blood pressure monthly.    Orders communicated by provider to facility via [facility]  -Add Quetiapine 12.5 mg PO every day PRN to Quetiapine 12.5 mg PO BID     Electronically signed by:  NIEVES Carranza CNP

## 2020-11-03 RX ORDER — QUETIAPINE FUMARATE 25 MG/1
TABLET, FILM COATED ORAL
Qty: 28 TABLET | Refills: 97
Start: 2020-11-03 | End: 2020-12-15

## 2020-11-17 DIAGNOSIS — M85.89 OSTEOPENIA OF MULTIPLE SITES: ICD-10-CM

## 2020-11-21 RX ORDER — CHOLECALCIFEROL (VITAMIN D3) 25 MCG
TABLET ORAL
Qty: 28 TABLET | Status: SHIPPED | OUTPATIENT
Start: 2020-11-21

## 2020-11-21 RX ORDER — MAGNESIUM OXIDE 400 MG/1
TABLET ORAL
Qty: 28 TABLET | Status: SHIPPED | OUTPATIENT
Start: 2020-11-21

## 2020-12-14 DIAGNOSIS — I10 BENIGN ESSENTIAL HYPERTENSION: Primary | ICD-10-CM

## 2020-12-14 DIAGNOSIS — F03.90 DEMENTIA WITHOUT BEHAVIORAL DISTURBANCE, UNSPECIFIED DEMENTIA TYPE: ICD-10-CM

## 2020-12-15 RX ORDER — QUETIAPINE FUMARATE 25 MG/1
TABLET, FILM COATED ORAL
Qty: 28 TABLET | Status: SHIPPED | OUTPATIENT
Start: 2020-12-15 | End: 2021-01-01

## 2020-12-15 RX ORDER — SPIRONOLACTONE AND HYDROCHLOROTHIAZIDE 25; 25 MG/1; MG/1
TABLET ORAL
Qty: 14 TABLET | Status: SHIPPED | OUTPATIENT
Start: 2020-12-15

## 2021-01-01 ENCOUNTER — LAB REQUISITION (OUTPATIENT)
Dept: LAB | Facility: CLINIC | Age: 86
End: 2021-01-01
Payer: COMMERCIAL

## 2021-01-01 ENCOUNTER — ASSISTED LIVING VISIT (OUTPATIENT)
Dept: GERIATRICS | Facility: CLINIC | Age: 86
End: 2021-01-01
Payer: COMMERCIAL

## 2021-01-01 ENCOUNTER — TELEPHONE (OUTPATIENT)
Dept: GERIATRICS | Facility: CLINIC | Age: 86
End: 2021-01-01
Payer: COMMERCIAL

## 2021-01-01 ENCOUNTER — TRANSFERRED RECORDS (OUTPATIENT)
Dept: HEALTH INFORMATION MANAGEMENT | Facility: CLINIC | Age: 86
End: 2021-01-01

## 2021-01-01 ENCOUNTER — TELEPHONE (OUTPATIENT)
Dept: GERIATRICS | Facility: CLINIC | Age: 86
End: 2021-01-01

## 2021-01-01 ENCOUNTER — RECORDS - HEALTHEAST (OUTPATIENT)
Dept: LAB | Facility: CLINIC | Age: 86
End: 2021-01-01

## 2021-01-01 VITALS
SYSTOLIC BLOOD PRESSURE: 127 MMHG | TEMPERATURE: 97.6 F | HEART RATE: 62 BPM | BODY MASS INDEX: 25.6 KG/M2 | WEIGHT: 140 LBS | DIASTOLIC BLOOD PRESSURE: 63 MMHG | RESPIRATION RATE: 18 BRPM

## 2021-01-01 VITALS
DIASTOLIC BLOOD PRESSURE: 63 MMHG | SYSTOLIC BLOOD PRESSURE: 138 MMHG | WEIGHT: 135.6 LBS | TEMPERATURE: 97.5 F | HEART RATE: 56 BPM | RESPIRATION RATE: 16 BRPM | BODY MASS INDEX: 24.8 KG/M2

## 2021-01-01 VITALS
WEIGHT: 135.6 LBS | BODY MASS INDEX: 24.8 KG/M2 | HEART RATE: 70 BPM | DIASTOLIC BLOOD PRESSURE: 78 MMHG | RESPIRATION RATE: 19 BRPM | TEMPERATURE: 97.8 F | SYSTOLIC BLOOD PRESSURE: 127 MMHG

## 2021-01-01 VITALS
DIASTOLIC BLOOD PRESSURE: 59 MMHG | HEART RATE: 54 BPM | WEIGHT: 139.6 LBS | TEMPERATURE: 98.2 F | SYSTOLIC BLOOD PRESSURE: 136 MMHG | BODY MASS INDEX: 25.53 KG/M2 | RESPIRATION RATE: 19 BRPM

## 2021-01-01 VITALS
RESPIRATION RATE: 22 BRPM | WEIGHT: 139.6 LBS | TEMPERATURE: 96.7 F | BODY MASS INDEX: 25.53 KG/M2 | DIASTOLIC BLOOD PRESSURE: 70 MMHG | HEART RATE: 96 BPM | SYSTOLIC BLOOD PRESSURE: 125 MMHG

## 2021-01-01 VITALS
BODY MASS INDEX: 25.53 KG/M2 | SYSTOLIC BLOOD PRESSURE: 136 MMHG | WEIGHT: 139.6 LBS | HEART RATE: 54 BPM | TEMPERATURE: 96.9 F | RESPIRATION RATE: 17 BRPM | DIASTOLIC BLOOD PRESSURE: 59 MMHG

## 2021-01-01 VITALS — BODY MASS INDEX: 24.32 KG/M2 | TEMPERATURE: 98.3 F | WEIGHT: 133 LBS

## 2021-01-01 VITALS — BODY MASS INDEX: 25.53 KG/M2 | TEMPERATURE: 97.5 F | WEIGHT: 139.6 LBS

## 2021-01-01 DIAGNOSIS — R19.5 LOOSE STOOLS: ICD-10-CM

## 2021-01-01 DIAGNOSIS — I10 ESSENTIAL HYPERTENSION: ICD-10-CM

## 2021-01-01 DIAGNOSIS — F03.91 DEMENTIA WITH BEHAVIORAL DISTURBANCE, UNSPECIFIED DEMENTIA TYPE: ICD-10-CM

## 2021-01-01 DIAGNOSIS — E87.1 HYPO-OSMOLALITY AND HYPONATREMIA: ICD-10-CM

## 2021-01-01 DIAGNOSIS — R06.00 DYSPNEA, UNSPECIFIED TYPE: Primary | ICD-10-CM

## 2021-01-01 DIAGNOSIS — F32.A DEPRESSION, UNSPECIFIED DEPRESSION TYPE: ICD-10-CM

## 2021-01-01 DIAGNOSIS — R63.4 LOSS OF WEIGHT: ICD-10-CM

## 2021-01-01 DIAGNOSIS — H40.9 GLAUCOMA, UNSPECIFIED GLAUCOMA TYPE, UNSPECIFIED LATERALITY: Primary | ICD-10-CM

## 2021-01-01 DIAGNOSIS — R60.0 LOWER EXTREMITY EDEMA: ICD-10-CM

## 2021-01-01 DIAGNOSIS — N39.0 URINARY TRACT INFECTION WITHOUT HEMATURIA, SITE UNSPECIFIED: Primary | ICD-10-CM

## 2021-01-01 DIAGNOSIS — F03.91 DEMENTIA WITH BEHAVIORAL DISTURBANCE, UNSPECIFIED DEMENTIA TYPE: Primary | ICD-10-CM

## 2021-01-01 DIAGNOSIS — H40.9 GLAUCOMA, UNSPECIFIED GLAUCOMA TYPE, UNSPECIFIED LATERALITY: ICD-10-CM

## 2021-01-01 DIAGNOSIS — R19.5 LOOSE STOOLS: Primary | ICD-10-CM

## 2021-01-01 DIAGNOSIS — H40.1133 PRIMARY OPEN ANGLE GLAUCOMA OF BOTH EYES, SEVERE STAGE: Primary | ICD-10-CM

## 2021-01-01 DIAGNOSIS — I10 BENIGN ESSENTIAL HYPERTENSION: ICD-10-CM

## 2021-01-01 DIAGNOSIS — F03.90 DEMENTIA WITHOUT BEHAVIORAL DISTURBANCE, UNSPECIFIED DEMENTIA TYPE: ICD-10-CM

## 2021-01-01 DIAGNOSIS — F03.90 DEMENTIA WITHOUT BEHAVIORAL DISTURBANCE, UNSPECIFIED DEMENTIA TYPE: Primary | ICD-10-CM

## 2021-01-01 DIAGNOSIS — M79.674 PAIN OF TOE OF RIGHT FOOT: Primary | ICD-10-CM

## 2021-01-01 DIAGNOSIS — F32.A DEPRESSION, UNSPECIFIED DEPRESSION TYPE: Primary | ICD-10-CM

## 2021-01-01 DIAGNOSIS — M79.674 PAIN OF TOE OF RIGHT FOOT: ICD-10-CM

## 2021-01-01 DIAGNOSIS — Z91.81 PERSONAL HISTORY OF FALL: ICD-10-CM

## 2021-01-01 DIAGNOSIS — F43.22 ADJUSTMENT DISORDER WITH ANXIOUS MOOD: ICD-10-CM

## 2021-01-01 DIAGNOSIS — N39.0 URINARY TRACT INFECTION, SITE NOT SPECIFIED: ICD-10-CM

## 2021-01-01 DIAGNOSIS — R60.0 LOCALIZED EDEMA: ICD-10-CM
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
## 2021-01-01 LAB
ALBUMIN SERPL-MCNC: 3.6 G/DL (ref 3.5–5)
ALBUMIN SERPL-MCNC: 3.6 G/DL (ref 3.5–5)
ALBUMIN UR-MCNC: NEGATIVE MG/DL
ALP SERPL-CCNC: 77 U/L (ref 45–120)
ALP SERPL-CCNC: 77 U/L (ref 45–120)
ALT SERPL W P-5'-P-CCNC: 10 U/L (ref 0–45)
ALT SERPL-CCNC: 10 U/L (ref 0–45)
AMORPH CRY #/AREA URNS HPF: ABNORMAL /HPF
ANION GAP SERPL CALCULATED.3IONS-SCNC: 6 MMOL/L (ref 5–18)
ANION GAP SERPL CALCULATED.3IONS-SCNC: 6 MMOL/L (ref 5–18)
ANION GAP SERPL CALCULATED.3IONS-SCNC: 9 MMOL/L (ref 5–18)
APPEARANCE UR: ABNORMAL
AST SERPL W P-5'-P-CCNC: 17 U/L (ref 0–40)
AST SERPL-CCNC: 17 U/L (ref 0–40)
BACTERIA #/AREA URNS HPF: ABNORMAL /HPF
BACTERIA UR CULT: NORMAL
BASOPHILS # BLD AUTO: 0 THOU/UL (ref 0–0.2)
BASOPHILS NFR BLD AUTO: 1 % (ref 0–2)
BILIRUB SERPL-MCNC: 0.4 MG/DL (ref 0–1)
BILIRUB SERPL-MCNC: 0.4 MG/DL (ref 0–1)
BILIRUB UR QL STRIP: NEGATIVE
BUN SERPL-MCNC: 19 MG/DL (ref 8–28)
BUN SERPL-MCNC: 27 MG/DL (ref 8–28)
BUN SERPL-MCNC: 27 MG/DL (ref 8–28)
CALCIUM SERPL-MCNC: 10.1 MG/DL (ref 8.5–10.5)
CALCIUM SERPL-MCNC: 9.5 MG/DL (ref 8.5–10.5)
CALCIUM SERPL-MCNC: 9.5 MG/DL (ref 8.5–10.5)
CHLORIDE BLD-SCNC: 103 MMOL/L (ref 98–107)
CHLORIDE BLD-SCNC: 96 MMOL/L (ref 98–107)
CHLORIDE SERPLBLD-SCNC: 103 MMOL/L (ref 98–107)
CO2 SERPL-SCNC: 31 MMOL/L (ref 22–31)
CO2 SERPL-SCNC: 32 MMOL/L (ref 22–31)
CO2 SERPL-SCNC: 32 MMOL/L (ref 22–31)
COLOR UR AUTO: ABNORMAL
CREAT SERPL-MCNC: 0.83 MG/DL (ref 0.6–1.1)
CREAT SERPL-MCNC: 0.83 MG/DL (ref 0.6–1.1)
CREAT SERPL-MCNC: 0.86 MG/DL (ref 0.6–1.1)
DIFFERENTIAL: ABNORMAL
EOSINOPHIL # BLD AUTO: 0.2 THOU/UL (ref 0–0.4)
EOSINOPHIL NFR BLD AUTO: 4 % (ref 0–6)
ERYTHROCYTE [DISTWIDTH] IN BLOOD BY AUTOMATED COUNT: 13.8 % (ref 11–14.5)
ERYTHROCYTE [DISTWIDTH] IN BLOOD BY AUTOMATED COUNT: 13.8 % (ref 11–14.5)
GFR SERPL CREATININE-BSD FRML MDRD: 57 ML/MIN/1.73M2
GFR SERPL CREATININE-BSD FRML MDRD: >60 ML/MIN/1.73M2
GFR SERPL CREATININE-BSD FRML MDRD: >60 ML/MIN/1.73M2
GLUCOSE BLD-MCNC: 95 MG/DL (ref 70–125)
GLUCOSE BLD-MCNC: 98 MG/DL (ref 70–125)
GLUCOSE SERPL-MCNC: 95 MG/DL (ref 70–125)
GLUCOSE UR STRIP-MCNC: NEGATIVE MG/DL
HCT VFR BLD AUTO: 35.6 % (ref 35–47)
HCT VFR BLD AUTO: 35.6 % (ref 35–47)
HEMOGLOBIN: 11.4 G/DL (ref 12–16)
HGB BLD-MCNC: 11.4 G/DL (ref 12–16)
HGB UR QL STRIP: NEGATIVE
IMM GRANULOCYTES # BLD: 0 THOU/UL
IMM GRANULOCYTES NFR BLD: 0 %
KETONES UR STRIP-MCNC: NEGATIVE MG/DL
LEUKOCYTE ESTERASE UR QL STRIP: ABNORMAL
LYMPHOCYTES # BLD AUTO: 1.6 THOU/UL (ref 0.8–4.4)
LYMPHOCYTES NFR BLD AUTO: 25 % (ref 20–40)
MAGNESIUM SERPL-MCNC: 2.1 MG/DL (ref 1.8–2.6)
MCH RBC QN AUTO: 30.2 PG (ref 27–34)
MCH RBC QN AUTO: 30.2 PG (ref 27–34)
MCHC RBC AUTO-ENTMCNC: 32 G/DL (ref 32–36)
MCHC RBC AUTO-ENTMCNC: 32 G/DL (ref 32–36)
MCV RBC AUTO: 94 FL (ref 80–100)
MCV RBC AUTO: 94 FL (ref 80–100)
MONOCYTES # BLD AUTO: 0.9 THOU/UL (ref 0–0.9)
MONOCYTES NFR BLD AUTO: 15 % (ref 2–10)
MUCOUS THREADS #/AREA URNS LPF: PRESENT /LPF
NEUTROPHILS # BLD AUTO: 3.4 THOU/UL (ref 2–7.7)
NEUTROPHILS NFR BLD AUTO: 55 % (ref 50–70)
NITRATE UR QL: NEGATIVE
PH UR STRIP: 7.5 [PH] (ref 5–7)
PLATELET # BLD AUTO: 182 THOU/UL (ref 140–440)
PLATELET # BLD AUTO: 182 THOU/UL (ref 140–440)
PMV BLD AUTO: 10.8 FL (ref 8.5–12.5)
POTASSIUM BLD-SCNC: 3.8 MMOL/L (ref 3.5–5)
POTASSIUM BLD-SCNC: 4.2 MMOL/L (ref 3.5–5)
POTASSIUM SERPL-SCNC: 4.2 MMOL/L (ref 3.5–5)
PROT SERPL-MCNC: 7.5 G/DL (ref 6–8)
PROT SERPL-MCNC: 7.5 G/DL (ref 6–8)
RBC # BLD AUTO: 3.77 MILL/UL (ref 3.8–5.4)
RBC # BLD AUTO: 3.77 MILL/UL (ref 3.8–5.4)
SARS-COV-2 PCR COMMENT: NORMAL
SARS-COV-2 PCR COMMENT: NORMAL
SARS-COV-2 RNA SPEC QL NAA+PROBE: NEGATIVE
SARS-COV-2 RNA SPEC QL NAA+PROBE: NEGATIVE
SARS-COV-2 VIRUS SPECIMEN SOURCE: NORMAL
SARS-COV-2 VIRUS SPECIMEN SOURCE: NORMAL
SODIUM SERPL-SCNC: 136 MMOL/L (ref 136–145)
SODIUM SERPL-SCNC: 141 MMOL/L (ref 136–145)
SODIUM SERPL-SCNC: 141 MMOL/L (ref 136–145)
SP GR UR STRIP: 1.01 (ref 1–1.03)
SQUAMOUS EPITHELIAL: 2 /HPF
TRI-PHOS CRY #/AREA URNS HPF: ABNORMAL /HPF
UROBILINOGEN UR STRIP-MCNC: <2 MG/DL
WBC # BLD AUTO: 6.2 THOU/UL (ref 4–11)
WBC URINE: 11 /HPF
WBC: 6.2 THOU/UL (ref 4–11)

## 2021-01-01 PROCEDURE — 36415 COLL VENOUS BLD VENIPUNCTURE: CPT | Mod: ORL | Performed by: NURSE PRACTITIONER

## 2021-01-01 PROCEDURE — 81001 URINALYSIS AUTO W/SCOPE: CPT | Mod: ORL

## 2021-01-01 PROCEDURE — 87086 URINE CULTURE/COLONY COUNT: CPT | Mod: ORL

## 2021-01-01 PROCEDURE — P9603 ONE-WAY ALLOW PRORATED MILES: HCPCS | Mod: ORL | Performed by: NURSE PRACTITIONER

## 2021-01-01 PROCEDURE — 87086 URINE CULTURE/COLONY COUNT: CPT | Mod: ORL | Performed by: NURSE PRACTITIONER

## 2021-01-01 PROCEDURE — 80048 BASIC METABOLIC PNL TOTAL CA: CPT | Mod: ORL | Performed by: NURSE PRACTITIONER

## 2021-01-01 RX ORDER — QUETIAPINE FUMARATE 25 MG/1
12.5 TABLET, FILM COATED ORAL 2 TIMES DAILY
Start: 2021-01-01 | End: 2021-01-01

## 2021-01-01 RX ORDER — QUETIAPINE FUMARATE 25 MG/1
25 TABLET, FILM COATED ORAL 2 TIMES DAILY
Start: 2021-01-01 | End: 2021-01-01

## 2021-01-01 RX ORDER — RISPERIDONE 0.25 MG/1
TABLET ORAL
Qty: 28 TABLET | Refills: 10 | Status: SHIPPED | OUTPATIENT
Start: 2021-01-01

## 2021-01-01 RX ORDER — RISPERIDONE 0.25 MG/1
TABLET ORAL
Qty: 56 TABLET | Refills: 10
Start: 2021-01-01 | End: 2021-01-01

## 2021-01-01 RX ORDER — RISPERIDONE 0.5 MG/1
TABLET ORAL
Qty: 56 TABLET | Refills: 10
Start: 2021-01-01 | End: 2021-01-01

## 2021-01-01 RX ORDER — FLUOXETINE 10 MG/1
10 CAPSULE ORAL DAILY
Start: 2021-01-01 | End: 2021-01-01 | Stop reason: ALTCHOICE

## 2021-01-01 RX ORDER — LOPERAMIDE HCL 2 MG
CAPSULE ORAL
Qty: 30 CAPSULE | Refills: 10 | Status: SHIPPED | OUTPATIENT
Start: 2021-01-01

## 2021-01-01 RX ORDER — RISPERIDONE 0.25 MG/1
0.25 TABLET ORAL 2 TIMES DAILY
COMMUNITY
End: 2021-01-01

## 2021-01-01 RX ORDER — DORZOLAMIDE HCL 20 MG/ML
SOLUTION/ DROPS OPHTHALMIC
Qty: 10 ML | Refills: 97 | Status: SHIPPED | OUTPATIENT
Start: 2021-01-01

## 2021-01-01 RX ORDER — RISPERIDONE 0.25 MG/1
TABLET ORAL
Qty: 56 TABLET | Refills: 10 | Status: SHIPPED | OUTPATIENT
Start: 2021-01-01 | End: 2021-01-01

## 2021-01-01 RX ORDER — TRAVOPROST OPHTHALMIC SOLUTION 0.04 MG/ML
SOLUTION OPHTHALMIC
Qty: 2.5 ML | Refills: 97 | Status: SHIPPED | OUTPATIENT
Start: 2021-01-01

## 2021-01-01 RX ORDER — TIMOLOL MALEATE 5 MG/ML
SOLUTION/ DROPS OPHTHALMIC
Qty: 5 ML | Refills: 97 | Status: SHIPPED | OUTPATIENT
Start: 2021-01-01

## 2021-01-01 RX ORDER — CITALOPRAM HYDROBROMIDE 10 MG/1
10 TABLET ORAL DAILY
Qty: 30 TABLET | Refills: 3 | Status: SHIPPED | OUTPATIENT
Start: 2021-01-01

## 2021-01-01 RX ORDER — RISPERIDONE 0.5 MG/1
TABLET ORAL
Qty: 56 TABLET | Refills: 10 | Status: SHIPPED | OUTPATIENT
Start: 2021-01-01 | End: 2021-01-01

## 2021-01-01 RX ORDER — NITROFURANTOIN MACROCRYSTAL 100 MG
100 CAPSULE ORAL 2 TIMES DAILY
Qty: 10 CAPSULE | Refills: 0
Start: 2021-01-01 | End: 2022-01-01

## 2021-01-01 RX ORDER — QUETIAPINE FUMARATE 25 MG/1
TABLET, FILM COATED ORAL
Qty: 56 TABLET | Status: SHIPPED | OUTPATIENT
Start: 2021-01-01 | End: 2021-01-01

## 2021-01-27 NOTE — LETTER
1/27/2021        RE: Slim Sams  [facility]  [address] [address]        [place] [facility]  [place] Medical Record Number:  [id number]  Place of Service where encounter took place:  [facility] [place] ([facility] [[id number]]  Chief Complaint   Patient presents with     RECHECK     HPI:    Slim Sams  is a 89 year old (8/4/1924), who is being seen today for an episodic care visit.  HPI information obtained from: facility chart records, facility staff, patient report and [facility] chart review.     Today's concern is:    Right Toe Pain. Complains of her toe hurting. Cannot describe pain in further detail. No issues noted by staff.      Weight Loss. Upon review of documentation, weight 139 lbs on 10/29/18 -> 151.6 lbs on 9/20/19 -> 156.8 lbs on 12/20/19 -> 148.4 lbs on 6/20/20 -> 138.8 lbs on 10/20/20 -> 135.4 lbs on 1/25/21.     Hypertension. No recent blood pressures available to review.     Past Medical and Surgical History reviewed in Epic today.    MEDICATIONS:  Current Outpatient Medications   Medication Sig Dispense Refill     calcium carbonate 600 mg-vitamin D 400 units (CALTRATE) 600-400 MG-UNIT per tablet TAKE 1 TABLET BY MOUTH TWICE DAILY 56 tablet PRN     dorzolamide (TRUSOPT) 2 % ophthalmic solution INSTILL ONE DROP IN EACH EYE THREE TIMES DAILY 10 mL 97     FLUoxetine (PROZAC) 10 MG capsule TAKE 1 CAPSULE BY MOUTH ONCE DAILY ( TAKE WITH 20MG FOR A TOTAL DOSE OF 30MG) 28 capsule 97     FLUoxetine (PROZAC) 20 MG capsule TAKE 1 CAPSULE BY MOUTH ONCE DAILY ( TAKE WITH 10MG FOR A TOTAL DOSE OF 30MG) 28 capsule 97     magnesium oxide (MAG-OX) 400 MG tablet TAKE 1 TABLET BY MOUTH ONCE DAILY 28 tablet PRN     MAGNESIUM OXIDE PO Take 400 mg by mouth daily       metoprolol succinate ER (TOPROL-XL) 25 MG 24 hr tablet TAKE 1 TABLET BY MOUTH ONCE DAILY 28 tablet 97     Multiple Vitamins-Minerals (CEROVITE SENIOR) TABS TAKE 1  TABLET BY MOUTH ONCE DAILY 28 tablet 97     QUEtiapine (SEROQUEL) 25 MG tablet TAKE ONE-HALF TABLET (12.5MG) BY MOUTH TWICE DAILY;& TAKE ONE-HALF TABLET (12.5MG) ONCE DAILY AS NEEDED 28 tablet PRN     spironolactone-HCTZ (ALDACTAZIDE) 25-25 MG tablet TAKE ONE-HALF TABLET BY MOUTH ONCE DAILY 14 tablet PRN     timolol maleate (TIMOPTIC) 0.5 % ophthalmic solution INSTILL ONE DROP IN EACH EYE ONCE DAILY 5 mL 10     travoprost, BAK Free, (TRAVATAN Z) 0.004 % ophthalmic solution 1 drop At Bedtime       VITAMIN D3 25 MCG (1000 UT) tablet TAKE 1 TABLET BY MOUTH ONCE DAILY 28 tablet PRN     REVIEW OF SYSTEMS:  Unobtainable secondary to cognitive impairment.     Objective:  /63   Pulse 62   Temp 97.6  F (36.4  C)   Resp 18   Wt 63.5 kg (140 lb)   BMI 25.60 kg/m    Exam:  GENERAL APPEARANCE:  Alert, in no distress  ENT:  Mouth and posterior oropharynx normal, moist mucous membranes  EYES:  EOM, conjunctivae, lids, pupils and irises normal  RESP:  respiratory effort and palpation of chest normal, lungs clear to auscultation , no respiratory distress  CV:  Palpation and auscultation of heart done , regular rate and rhythm, no murmur, rub, or gallop  M/S:   Slight redness noted on right great toe. Improved minutes are socks were removed. No warmth.   SKIN:  Inspection of skin and subcutaneous tissue baseline, Palpation of skin and subcutaneous tissue baseline  NEURO:   Cranial nerves 2-12 are normal tested and grossly at patient's baseline  PSYCH:  memory impaired     Labs:   Labs done in SNF are in [facility]. Please refer to them using [facility]/Care Everywhere.    ASSESSMENT/PLAN:  Right Toe Pain. Onychomycosis present. No open areas present. Last seen by podiatry on 12/8/20. Question if shoes are too tight or if compression stockings are contributing to discomfort. Will continue to monitor.     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 Chronic, progressive. Requires 24 hour supervision. Resides in  secure memory unit of AL. Able to make needs known. Ambulates independently. Mini-Cog Assessment Score 1 on 6/25/18. SLUMS Score 2/30 on 11/13/19. Unable to tolerate Namenda. Staff to assist with ADLs and meals. Behavior includes exit seeking with aggressive behaviors and paranoia. Continue Quetiapine as ordered. Also on Fluoxetine. Given COVID-19 pandemic, is not appropriate for GDR at this time due to limited social interactions and more isolation.      Weight Loss. Likely related to above. As weight is down ~20 lbs in 1 year, will re-approach family about hospice if patient is appropriate. Has been interested in program in the past. Further plans pending discussion with family. If family declines hospice, is due for repeat labs.      Hypertension. Will ask staff to obtain more recent blood pressure. Continue Spironolactone-Hydrochlorothiazide and Metoprolol as ordered for now.     Electronically signed by:  NIEVES Carranza CNP               Sincerely,        NIEVES Carranza CNP

## 2021-01-27 NOTE — PROGRESS NOTES
[place] [facility]  [place] Medical Record Number:  [id number]  Place of Service where encounter took place:  [facility] [place] ([facility] [[id number]]  Chief Complaint   Patient presents with     RECHECK     HPI:    Slim Sams  is a 89 year old (8/4/1924), who is being seen today for an episodic care visit.  HPI information obtained from: facility chart records, facility staff, patient report and [facility] chart review.     Today's concern is:    Right Toe Pain. Complains of her toe hurting. Cannot describe pain in further detail. No issues noted by staff.      Weight Loss. Upon review of documentation, weight 139 lbs on 10/29/18 -> 151.6 lbs on 9/20/19 -> 156.8 lbs on 12/20/19 -> 148.4 lbs on 6/20/20 -> 138.8 lbs on 10/20/20 -> 135.4 lbs on 1/25/21.     Hypertension. No recent blood pressures available to review.     Past Medical and Surgical History reviewed in Epic today.    MEDICATIONS:  Current Outpatient Medications   Medication Sig Dispense Refill     calcium carbonate 600 mg-vitamin D 400 units (CALTRATE) 600-400 MG-UNIT per tablet TAKE 1 TABLET BY MOUTH TWICE DAILY 56 tablet PRN     dorzolamide (TRUSOPT) 2 % ophthalmic solution INSTILL ONE DROP IN EACH EYE THREE TIMES DAILY 10 mL 97     FLUoxetine (PROZAC) 10 MG capsule TAKE 1 CAPSULE BY MOUTH ONCE DAILY ( TAKE WITH 20MG FOR A TOTAL DOSE OF 30MG) 28 capsule 97     FLUoxetine (PROZAC) 20 MG capsule TAKE 1 CAPSULE BY MOUTH ONCE DAILY ( TAKE WITH 10MG FOR A TOTAL DOSE OF 30MG) 28 capsule 97     magnesium oxide (MAG-OX) 400 MG tablet TAKE 1 TABLET BY MOUTH ONCE DAILY 28 tablet PRN     MAGNESIUM OXIDE PO Take 400 mg by mouth daily       metoprolol succinate ER (TOPROL-XL) 25 MG 24 hr tablet TAKE 1 TABLET BY MOUTH ONCE DAILY 28 tablet 97     Multiple Vitamins-Minerals (CEROVITE SENIOR) TABS TAKE 1 TABLET BY MOUTH ONCE DAILY 28 tablet 97     QUEtiapine (SEROQUEL) 25 MG tablet TAKE ONE-HALF TABLET (12.5MG) BY MOUTH  TWICE DAILY;& TAKE ONE-HALF TABLET (12.5MG) ONCE DAILY AS NEEDED 28 tablet PRN     spironolactone-HCTZ (ALDACTAZIDE) 25-25 MG tablet TAKE ONE-HALF TABLET BY MOUTH ONCE DAILY 14 tablet PRN     timolol maleate (TIMOPTIC) 0.5 % ophthalmic solution INSTILL ONE DROP IN EACH EYE ONCE DAILY 5 mL 10     travoprost, BAK Free, (TRAVATAN Z) 0.004 % ophthalmic solution 1 drop At Bedtime       VITAMIN D3 25 MCG (1000 UT) tablet TAKE 1 TABLET BY MOUTH ONCE DAILY 28 tablet PRN     REVIEW OF SYSTEMS:  Unobtainable secondary to cognitive impairment.     Objective:  /63   Pulse 62   Temp 97.6  F (36.4  C)   Resp 18   Wt 63.5 kg (140 lb)   BMI 25.60 kg/m    Exam:  GENERAL APPEARANCE:  Alert, in no distress  ENT:  Mouth and posterior oropharynx normal, moist mucous membranes  EYES:  EOM, conjunctivae, lids, pupils and irises normal  RESP:  respiratory effort and palpation of chest normal, lungs clear to auscultation , no respiratory distress  CV:  Palpation and auscultation of heart done , regular rate and rhythm, no murmur, rub, or gallop  M/S:   Slight redness noted on right great toe. Improved minutes are socks were removed. No warmth.   SKIN:  Inspection of skin and subcutaneous tissue baseline, Palpation of skin and subcutaneous tissue baseline  NEURO:   Cranial nerves 2-12 are normal tested and grossly at patient's baseline  PSYCH:  memory impaired     Labs:   Labs done in SNF are in [facility]. Please refer to them using [facility]/Care Everywhere.    ASSESSMENT/PLAN:  Right Toe Pain. Onychomycosis present. No open areas present. Last seen by podiatry on 12/8/20. Question if shoes are too tight or if compression stockings are contributing to discomfort. Will continue to monitor.     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 Chronic, progressive. Requires 24 hour supervision. Resides in secure memory unit of AL. Able to make needs known. Ambulates independently. Mini-Cog Assessment Score 1 on 6/25/18.  SLUMS Score 2/30 on 11/13/19. Unable to tolerate Namenda. Staff to assist with ADLs and meals. Behavior includes exit seeking with aggressive behaviors and paranoia. Continue Quetiapine as ordered. Also on Fluoxetine. Given COVID-19 pandemic, is not appropriate for GDR at this time due to limited social interactions and more isolation.      Weight Loss. Likely related to above. As weight is down ~20 lbs in 1 year, will re-approach family about hospice if patient is appropriate. Has been interested in program in the past. Further plans pending discussion with family. If family declines hospice, is due for repeat labs.      Hypertension. Will ask staff to obtain more recent blood pressure. Continue Spironolactone-Hydrochlorothiazide and Metoprolol as ordered for now.     Electronically signed by:  NIEVES Carranza CNP

## 2021-02-10 NOTE — PROGRESS NOTES
[place] [facility]  [place] Medical Record Number:  [id number]  Place of Service where encounter took place:  [facility] [place] ([facility]) [[id number]]  Chief Complaint   Patient presents with     RECHECK     HPI:    Slim Sams  is a 89 year old (8/4/1924), who is being seen today for an episodic care visit.  HPI information obtained from: facility chart records, facility staff, patient report, [facility] chart review and family/first contact daughter in-law Jennifer report.     Today's concern is:    Weight Loss. Weight 139 lbs on 10/29/18 -> 151.6 lbs on 9/20/19 -> 156.8 lbs on 12/20/19 -> 148.4 lbs on 6/20/20 -> 138.8 lbs on 10/20/20 -> 135.4 lbs on 1/25/21 -> 135.6 lbs on 1/31/21. Has had ~ 9% weight loss in 6 months. Discussed with daughter in-law Jennifer weight loss and potential for hospice care services. [facility] evaluated patient on 2/9/21 and reported she did not meet qualifications for enrollment at that time. Today, staff reports patient eats well at meal time. Is active throughout the day on the unit, ambulating down the halls.     Dementia with Behaviors. Per staff report, patient has increased behaviors in the afternoon. Looking for her mom and dad. Going to the exit door and trying to leave. Behaviors ramp up around 2 pm.    Right Toe Pain. On 1/27/21, reported having right toe pain. Today, no reports of toe pain.     Past Medical and Surgical History reviewed in Epic today.  MEDICATIONS:  Current Outpatient Medications   Medication Sig Dispense Refill     calcium carbonate 600 mg-vitamin D 400 units (CALTRATE) 600-400 MG-UNIT per tablet TAKE 1 TABLET BY MOUTH TWICE DAILY 56 tablet PRN     dorzolamide (TRUSOPT) 2 % ophthalmic solution INSTILL ONE DROP IN EACH EYE THREE TIMES DAILY 10 mL 97     FLUoxetine (PROZAC) 10 MG capsule TAKE 1 CAPSULE BY MOUTH ONCE DAILY ( TAKE WITH 20MG FOR A TOTAL DOSE OF 30MG) 28 capsule 97     FLUoxetine (PROZAC) 20 MG  capsule TAKE 1 CAPSULE BY MOUTH ONCE DAILY ( TAKE WITH 10MG FOR A TOTAL DOSE OF 30MG) 28 capsule 97     magnesium oxide (MAG-OX) 400 MG tablet TAKE 1 TABLET BY MOUTH ONCE DAILY 28 tablet PRN     metoprolol succinate ER (TOPROL-XL) 25 MG 24 hr tablet TAKE 1 TABLET BY MOUTH ONCE DAILY 28 tablet 97     Multiple Vitamins-Minerals (CEROVITE SENIOR) TABS TAKE 1 TABLET BY MOUTH ONCE DAILY 28 tablet 97     QUEtiapine (SEROQUEL) 25 MG tablet Take 1 tablet (25 mg) by mouth 2 times daily       spironolactone-HCTZ (ALDACTAZIDE) 25-25 MG tablet TAKE ONE-HALF TABLET BY MOUTH ONCE DAILY 14 tablet PRN     timolol maleate (TIMOPTIC) 0.5 % ophthalmic solution INSTILL ONE DROP IN EACH EYE ONCE DAILY 5 mL 10     travoprost, ASTRID Free, (TRAVATAN Z) 0.004 % ophthalmic solution 1 drop At Bedtime       VITAMIN D3 25 MCG (1000 UT) tablet TAKE 1 TABLET BY MOUTH ONCE DAILY 28 tablet PRN     REVIEW OF SYSTEMS:  Unobtainable secondary to cognitive impairment.     Objective:  /78   Pulse 70   Temp 97.8  F (36.6  C)   Resp 19   Wt 61.5 kg (135 lb 9.6 oz)   BMI 24.80 kg/m    Exam:  GENERAL APPEARANCE:  Alert, in no distress  RESP:  no respiratory distress  PSYCH:  memory impaired     Labs:   Labs done in SNF are in [place] EPIC. Please refer to them using [facility]/Care Everywhere.    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 Chronic, progressive. Requires 24 hour supervision. Resides in secure memory unit of AL. Able to make needs known. Ambulates independently. Mini-Cog Assessment Score 1 on 6/25/18. SLUMS Score 2/30 on 11/13/19. Unable to tolerate Namenda. Staff to assist with ADLs and meals. As behaviors have increased, which includes exit seeking with aggressive behaviors and paranoia, will increase Quetiapine to 25 mg BID. Also on Fluoxetine. Daughter in-law in agreement.     Weight Loss. Likely related to above. Discussed indications of overall decline with daughter in-law. Daughter in-law would be  in favor of hospice enrollment as she and her  feel patient no longer has a good quality of life. Did not qualify for hospice on 2/9/21 due to 9% weight loss in 6 months, not 10%. Will continue to monitor in the coming months to see if patient qualifies.     Right Toe Pain. No complaints of toe pain. ADDENDUM 2/11/21: Patient complained of right toe pain. Had been seen by Podiatry on 2/1/21. Toenails debrided due to onychomycosis. Shoes noted to be too tight and put away in closet. Pain improved. Daughter in-law updated.     Hypertension. Monitor blood pressure monthly. Continue Metoprolol and Spironolactone as ordered.    Bilateral Lower Extremity Edema. Has compression stockings. Also takes Spironolactone.     Glaucoma. Continue Dorzolamide, Timolol and Travoprost as ordered.    Orders written by provider at facility  Increase Quetiapine 25 mg PO BID  Discontinue Quetiapine 12.5 mg PO every day PRN    Electronically signed by:  NIEVES Carranza CNP

## 2021-02-10 NOTE — LETTER
2/10/2021        RE: Slim Sams  [facility]  [address] [address]        [facility]  [facility] Record Number:  [id number]  Place of Service where encounter took place:  [facility] ([facility]) [[id number]]  Chief Complaint   Patient presents with     RECHECK     HPI:    Slim Sams  is a 89 year old (8/4/1924), who is being seen today for an episodic care visit.  HPI information obtained from: facility chart records, facility staff, patient report, [facility] chart review and family/first contact daughter in-law Jennifer report.     Today's concern is:    Weight Loss. Weight 139 lbs on 10/29/18 -> 151.6 lbs on 9/20/19 -> 156.8 lbs on 12/20/19 -> 148.4 lbs on 6/20/20 -> 138.8 lbs on 10/20/20 -> 135.4 lbs on 1/25/21 -> 135.6 lbs on 1/31/21. Has had ~ 9% weight loss in 6 months. Discussed with daughter in-law Jennifer weight loss and potential for hospice care services. [facility] evaluated patient on 2/9/21 and reported she did not meet qualifications for enrollment at that time. Today, staff reports patient eats well at meal time. Is active throughout the day on the unit, ambulating down the halls.     Dementia with Behaviors. Per staff report, patient has increased behaviors in the afternoon. Looking for her mom and dad. Going to the exit door and trying to leave. Behaviors ramp up around 2 pm.    Right Toe Pain. On 1/27/21, reported having right toe pain. Today, no reports of toe pain.     Past Medical and Surgical History reviewed in Epic today.  MEDICATIONS:  Current Outpatient Medications   Medication Sig Dispense Refill     calcium carbonate 600 mg-vitamin D 400 units (CALTRATE) 600-400 MG-UNIT per tablet TAKE 1 TABLET BY MOUTH TWICE DAILY 56 tablet PRN     dorzolamide (TRUSOPT) 2 % ophthalmic solution INSTILL ONE DROP IN EACH EYE THREE TIMES DAILY 10 mL 97     FLUoxetine (PROZAC) 10 MG capsule TAKE  1 CAPSULE BY MOUTH ONCE DAILY ( TAKE WITH 20MG FOR A TOTAL DOSE OF 30MG) 28 capsule 97     FLUoxetine (PROZAC) 20 MG capsule TAKE 1 CAPSULE BY MOUTH ONCE DAILY ( TAKE WITH 10MG FOR A TOTAL DOSE OF 30MG) 28 capsule 97     magnesium oxide (MAG-OX) 400 MG tablet TAKE 1 TABLET BY MOUTH ONCE DAILY 28 tablet PRN     metoprolol succinate ER (TOPROL-XL) 25 MG 24 hr tablet TAKE 1 TABLET BY MOUTH ONCE DAILY 28 tablet 97     Multiple Vitamins-Minerals (CEROVITE SENIOR) TABS TAKE 1 TABLET BY MOUTH ONCE DAILY 28 tablet 97     QUEtiapine (SEROQUEL) 25 MG tablet Take 1 tablet (25 mg) by mouth 2 times daily       spironolactone-HCTZ (ALDACTAZIDE) 25-25 MG tablet TAKE ONE-HALF TABLET BY MOUTH ONCE DAILY 14 tablet PRN     timolol maleate (TIMOPTIC) 0.5 % ophthalmic solution INSTILL ONE DROP IN EACH EYE ONCE DAILY 5 mL 10     travoprost, ASTRID Free, (TRAVATAN Z) 0.004 % ophthalmic solution 1 drop At Bedtime       VITAMIN D3 25 MCG (1000 UT) tablet TAKE 1 TABLET BY MOUTH ONCE DAILY 28 tablet PRN     REVIEW OF SYSTEMS:  Unobtainable secondary to cognitive impairment.     Objective:  /78   Pulse 70   Temp 97.8  F (36.6  C)   Resp 19   Wt 61.5 kg (135 lb 9.6 oz)   BMI 24.80 kg/m    Exam:  GENERAL APPEARANCE:  Alert, in no distress  RESP:  no respiratory distress  PSYCH:  memory impaired     Labs:   Labs done in SNF are in [facility]. Please refer to them using [employer]/Care Everywhere.    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 Chronic, progressive. Requires 24 hour supervision. Resides in secure memory unit [facility]. Able to make needs known. Ambulates independently. Mini-Cog Assessment Score 1 on 6/25/18. SLUMS Score 2/30 on 11/13/19. Unable to tolerate Namenda. Staff to assist with ADLs and meals. As behaviors have increased, which includes exit seeking with aggressive behaviors and paranoia, will increase Quetiapine to 25 mg BID. Also on Fluoxetine. Daughter in-law in agreement.     Weight  Loss. Likely related to above. Discussed indications of overall decline with daughter in-law. Daughter in-law would be in favor of hospice enrollment as she and her  feel patient no longer has a good quality of life. Did not qualify for hospice on 2/9/21 due to 9% weight loss in 6 months, not 10%. Will continue to monitor in the coming months to see if patient qualifies.     Right Toe Pain. No complaints of toe pain. ADDENDUM 2/11/21: Patient complained of right toe pain. Had been seen by Podiatry on 2/1/21. Toenails debrided due to onychomycosis. Shoes noted to be too tight and put away in closet. Pain improved. Daughter in-law updated.     Hypertension. Monitor blood pressure monthly. Continue Metoprolol and Spironolactone as ordered.    Bilateral Lower Extremity Edema. Has compression stockings. Also takes Spironolactone.     Glaucoma. Continue Dorzolamide, Timolol and Travoprost as ordered.    Orders written by provider at facility  Increase Quetiapine 25 mg PO BID  Discontinue Quetiapine 12.5 mg PO every day PRN    Electronically signed by:  NIEVES Carranza CNP               Sincerely,        NIEVES Carranza CNP

## 2021-02-24 NOTE — LETTER
"    2/24/2021        RE: Slim Sams  [facility]  [address] [address] [address]        [place]  [facility] Record Number:  [id number]  [facility] [[id number]]  Chief Complaint   Patient presents with     RECHECK     HPI:    Slim Sams  is a 89 year old (8/4/1924), who is being seen today for an episodic care visit.  HPI information obtained from: facility chart records, facility staff, patient report and [facility] chart review.     Today's concern is:    Dyspnea. Noted to have shortness of breath on 2/19/21. Pausing after a few spoken words. Lung sounds diminished. Did not get up during the day as she reported she was tired and wanted to rest. A chest x-ray was ordered and revealed no evidence of acute pulmonary disease. On 2/20/21, patient reported she couldn't breath and had right-sided rib pain. Had crackles in her lungs. Sent to [facility] ED on 2/20/21. Noted to have tenderness along right lateral ribs and a small 2 cm x 3 cm bruise as well as the right humerus 5 cm x 4 cm. A chest x-ray revealed \"Cardiomegaly with vascular congestion\". BNP normal. Discharged with instructions to take Tylenol. Today, patient denies pain or shortness of breath.     Dementia with Behavioral Disturbance. Per staff report, becomes anxious and difficult to re-direct around 3-4 pm in the afternoon. Looks for her mother or has to call her sister. Wants to leave the unit. Are requesting to have her Quetiapine PRN back which was discontinued on 2/10/21.     Hypertension. Blood pressure 136/53 on 2/20/21 and 127/78 on 1/31/21.    Past Medical and Surgical History reviewed in Epic today.    MEDICATIONS:  Current Outpatient Medications   Medication Sig Dispense Refill     calcium carbonate 600 mg-vitamin D 400 units (CALTRATE) 600-400 MG-UNIT per tablet TAKE 1 TABLET BY MOUTH " TWICE DAILY 56 tablet PRN     dorzolamide (TRUSOPT) 2 % ophthalmic solution INSTILL ONE DROP IN EACH EYE THREE TIMES DAILY 10 mL 97     FLUoxetine (PROZAC) 10 MG capsule TAKE 1 CAPSULE BY MOUTH ONCE DAILY ( TAKE WITH 20MG FOR A TOTAL DOSE OF 30MG) 28 capsule 97     FLUoxetine (PROZAC) 20 MG capsule TAKE 1 CAPSULE BY MOUTH ONCE DAILY ( TAKE WITH 10MG FOR A TOTAL DOSE OF 30MG) 28 capsule 97     magnesium oxide (MAG-OX) 400 MG tablet TAKE 1 TABLET BY MOUTH ONCE DAILY 28 tablet PRN     metoprolol succinate ER (TOPROL-XL) 25 MG 24 hr tablet TAKE 1 TABLET BY MOUTH ONCE DAILY 28 tablet 97     Multiple Vitamins-Minerals (CEROVITE SENIOR) TABS TAKE 1 TABLET BY MOUTH ONCE DAILY 28 tablet 97     QUEtiapine (SEROQUEL) 25 MG tablet Take 1 tablet (25 mg) by mouth 2 times daily       spironolactone-HCTZ (ALDACTAZIDE) 25-25 MG tablet TAKE ONE-HALF TABLET BY MOUTH ONCE DAILY 14 tablet PRN     timolol maleate (TIMOPTIC) 0.5 % ophthalmic solution INSTILL ONE DROP IN EACH EYE ONCE DAILY 5 mL 10     travoprost ASTRID FREE (TRAVATAN Z) 0.004 % ophthalmic solution INSTILL ONE DROP IN EACH EYE AT BEDTIME 2.5 mL 97     VITAMIN D3 25 MCG (1000 UT) tablet TAKE 1 TABLET BY MOUTH ONCE DAILY 28 tablet PRN     REVIEW OF SYSTEMS:  Unobtainable secondary to cognitive impairment.     Objective:  /63   Pulse 56   Temp 97.5  F (36.4  C)   Resp 16   Wt 61.5 kg (135 lb 9.6 oz)   BMI 24.80 kg/m    Exam:  GENERAL APPEARANCE:  Alert, in no distress  ENT:  Mouth and posterior oropharynx normal, moist mucous membranes  EYES:  EOM, conjunctivae, lids, pupils and irises normal  RESP:  respiratory effort and palpation of chest normal, lungs clear to auscultation , no respiratory distress  CV:  Palpation and auscultation of heart done , regular rate and rhythm, no murmur, rub, or gallop  ABDOMEN:  normal bowel sounds, soft, nontender, no hepatosplenomegaly or other masses  M/S:   Active movement of bilateral upper and lower extremities. 1+ edema in  BLE.  SKIN:  Inspection of skin and subcutaneous tissue baseline, Palpation of skin and subcutaneous tissue baseline  NEURO:   Cranial nerves 2-12 are normal tested and grossly at patient's baseline  PSYCH:  memory impaired     Labs:   Labs done in SNF are in [place] [facility]. Please refer to them using EPIC/Care Everywhere.    ASSESSMENT/PLAN:  Dyspnea. Resulting in ED visit. Chest x-ray with vascular congestion, but weights trending down overall. Takes Spironolactone-Hydrochlorothiazide. EKG with sinus bradycardia. Takes Metoprolol. Dyspnea may be secondary to contusions noted on ribs. Given dementia, is possible patient had a fall and did not report to staff. Edema in bilateral legs appears at baseline. Staff to continue to monitor.     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 Chronic, progressive. Requires 24 hour supervision. Resides in [facility] unit [facility]. Able to make needs known. Ambulates independently. Mini-Cog Assessment Score 1 on 6/25/18. SLUMS Score 2/30 on 11/13/19. Unable to tolerate Namenda. Staff to assist with ADLs and meals. As behaviors continue to increase in the afternoon, which includes exit seeking with aggressive behaviors and paranoia, will adjust Quetiapine from 25 mg PO BID to Quetiapine 12.5 mg PO BID and 25 mg PO at bedtime. Also on Fluoxetine. Continue to monitor behaviors.     Hypertension. Blood pressures < 140/90. Monitor monthly. Takes Spironolactone-Hydrochlorothiazide and Metoprolol.     Orders written by provider at facility  Change Quetiapine to 12.5 mg PO BID and 25 mg PO QHS    Electronically signed by:  NIEVES Carranza CNP           Sincerely,        NIEVES Carranza CNP

## 2021-02-24 NOTE — PROGRESS NOTES
"[place] [facility]  [facility] Record Number:  [id number]  Place of Service where encounter took place:  [facility] ([facility]) [[id number]]  Chief Complaint   Patient presents with     RECHECK     HPI:    Slim Sams  is a 89 year old (8/4/1924), who is being seen today for an episodic care visit.  HPI information obtained from: facility chart records, facility staff, patient report and [facility] chart review.     Today's concern is:    Dyspnea. Noted to have shortness of breath on 2/19/21. Pausing after a few spoken words. Lung sounds diminished. Did not get up during the day as she reported she was tired and wanted to rest. A chest x-ray was ordered and revealed no evidence of acute pulmonary disease. On 2/20/21, patient reported she couldn't breath and had right-sided rib pain. Had crackles in her lungs. Sent to [facility] ED on 2/20/21. Noted to have tenderness along right lateral ribs and a small 2 cm x 3 cm bruise as well as the right humerus 5 cm x 4 cm. A chest x-ray revealed \"Cardiomegaly with vascular congestion\". BNP normal. Discharged with instructions to take Tylenol. Today, patient denies pain or shortness of breath.     Dementia with Behavioral Disturbance. Per staff report, becomes anxious and difficult to re-direct around 3-4 pm in the afternoon. Looks for her mother or has to call her sister. Wants to leave the unit. Are requesting to have her Quetiapine PRN back which was discontinued on 2/10/21.     Hypertension. Blood pressure 136/53 on 2/20/21 and 127/78 on 1/31/21.    Past Medical and Surgical History reviewed in Epic today.    MEDICATIONS:  Current Outpatient Medications   Medication Sig Dispense Refill     calcium carbonate 600 mg-vitamin D 400 units (CALTRATE) 600-400 MG-UNIT per tablet TAKE 1 TABLET BY MOUTH TWICE DAILY 56 tablet PRN     dorzolamide (TRUSOPT) 2 % ophthalmic solution INSTILL ONE DROP IN EACH EYE THREE TIMES DAILY " 10 mL 97     FLUoxetine (PROZAC) 10 MG capsule TAKE 1 CAPSULE BY MOUTH ONCE DAILY ( TAKE WITH 20MG FOR A TOTAL DOSE OF 30MG) 28 capsule 97     FLUoxetine (PROZAC) 20 MG capsule TAKE 1 CAPSULE BY MOUTH ONCE DAILY ( TAKE WITH 10MG FOR A TOTAL DOSE OF 30MG) 28 capsule 97     magnesium oxide (MAG-OX) 400 MG tablet TAKE 1 TABLET BY MOUTH ONCE DAILY 28 tablet PRN     metoprolol succinate ER (TOPROL-XL) 25 MG 24 hr tablet TAKE 1 TABLET BY MOUTH ONCE DAILY 28 tablet 97     Multiple Vitamins-Minerals (CEROVITE SENIOR) TABS TAKE 1 TABLET BY MOUTH ONCE DAILY 28 tablet 97     QUEtiapine (SEROQUEL) 25 MG tablet Take 1 tablet (25 mg) by mouth 2 times daily       spironolactone-HCTZ (ALDACTAZIDE) 25-25 MG tablet TAKE ONE-HALF TABLET BY MOUTH ONCE DAILY 14 tablet PRN     timolol maleate (TIMOPTIC) 0.5 % ophthalmic solution INSTILL ONE DROP IN EACH EYE ONCE DAILY 5 mL 10     travoprost ASTRID FREE (TRAVATAN Z) 0.004 % ophthalmic solution INSTILL ONE DROP IN EACH EYE AT BEDTIME 2.5 mL 97     VITAMIN D3 25 MCG (1000 UT) tablet TAKE 1 TABLET BY MOUTH ONCE DAILY 28 tablet PRN     REVIEW OF SYSTEMS:  Unobtainable secondary to cognitive impairment.     Objective:  /63   Pulse 56   Temp 97.5  F (36.4  C)   Resp 16   Wt 61.5 kg (135 lb 9.6 oz)   BMI 24.80 kg/m    Exam:  GENERAL APPEARANCE:  Alert, in no distress  ENT:  Mouth and posterior oropharynx normal, moist mucous membranes  EYES:  EOM, conjunctivae, lids, pupils and irises normal  RESP:  respiratory effort and palpation of chest normal, lungs clear to auscultation , no respiratory distress  CV:  Palpation and auscultation of heart done , regular rate and rhythm, no murmur, rub, or gallop  ABDOMEN:  normal bowel sounds, soft, nontender, no hepatosplenomegaly or other masses  M/S:   Active movement of bilateral upper and lower extremities. 1+ edema in BLE.  SKIN:  Inspection of skin and subcutaneous tissue baseline, Palpation of skin and subcutaneous tissue baseline  NEURO:    Cranial nerves 2-12 are normal tested and grossly at patient's baseline  PSYCH:  memory impaired     Labs:   Labs done in SNF are in [place] [facility]. Please refer to them using [employer]/Care Everywhere.    ASSESSMENT/PLAN:  Dyspnea. Resulting in ED visit. Chest x-ray with vascular congestion, but weights trending down overall. Takes Spironolactone-Hydrochlorothiazide. EKG with sinus bradycardia. Takes Metoprolol. Dyspnea may be secondary to contusions noted on ribs. Given dementia, is possible patient had a fall and did not report to staff. Edema in bilateral legs appears at baseline. Staff to continue to monitor.     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 Chronic, progressive. Requires 24 hour supervision. Resides in secure memory unit of AL. Able to make needs known. Ambulates independently. Mini-Cog Assessment Score 1 on 6/25/18. SLUMS Score 2/30 on 11/13/19. Unable to tolerate Namenda. Staff to assist with ADLs and meals. As behaviors continue to increase in the afternoon, which includes exit seeking with aggressive behaviors and paranoia, will adjust Quetiapine from 25 mg PO BID to Quetiapine 12.5 mg PO BID and 25 mg PO at bedtime. Also on Fluoxetine. Continue to monitor behaviors.     Hypertension. Blood pressures < 140/90. Monitor monthly. Takes Spironolactone-Hydrochlorothiazide and Metoprolol.     Orders written by provider at facility  Change Quetiapine to 12.5 mg PO BID and 25 mg PO QHS    Electronically signed by:  NIEVES Carranza CNP

## 2021-02-26 PROBLEM — F03.91 DEMENTIA WITH BEHAVIORAL DISTURBANCE, UNSPECIFIED DEMENTIA TYPE: Status: ACTIVE | Noted: 2021-01-01

## 2021-07-28 NOTE — PROGRESS NOTES
"[facility]    Chief Complaint   Patient presents with     RECHECK     HPI:  Slim Sams is a 89 year old  (8/4/1924), who is being [facility] today [facility][id number]     This is a 89-year-old female, with a past medical history significant for dementia, hypertension and adjustment disorder, who recently moved to [facility] from a different Assisted Living after living at home until September of 2018.    Today's concern is:     Dyspnea. Evaluated on 7/24/21 for shortness of breath. A chest x-ray revealed \"slightly low lung volumes. No obvious pneumonia somewhat streaky opacities in both lower lungs are new or more conspicuous when compared to 2/20/2021 but are favored to represent atelectasis or scar rather than pneumonia. Suspected slight cardiomegaly without pulmonary edema\". Labs revealed no acute findings. Hydrated with IV fluids and Ondansetron was administered in the case that nausea was causing her not to eat. Today, patient was sitting in the main room involved in activities. Now that activities are finished, states she needs to go home and her home is far from here. Agrees to walk to her room. Denies any shortness of breath.    Allergies, and PMH/PSH reviewed in [facility] today.  REVIEW OF SYSTEMS:  4 point ROS including Respiratory, CV, GI and , other than that noted in the HPI,  is negative    Objective:   Temp 97.5  F (36.4  C)   Wt 63.3 kg (139 lb 9.6 oz)   BMI 25.53 kg/m    GENERAL APPEARANCE:  Alert, in no distress  ENT:  Mouth and posterior oropharynx normal, moist mucous membranes  EYES:  EOM, conjunctivae, lids, pupils and irises normal  RESP:  respiratory effort and palpation of chest normal, lungs clear to auscultation , no respiratory distress  CV:  Palpation and auscultation of heart done , regular rate and rhythm, no murmur, rub, or gallop  ABDOMEN:  normal bowel sounds, soft, " nontender, no hepatosplenomegaly or other masses  M/S:   Active movement of bilateral upper and lower extremities. 1+ edema in BLE.  SKIN:  Inspection of skin and subcutaneous tissue baseline, Palpation of skin and subcutaneous tissue baseline  NEURO:   Cranial nerves 2-12 are normal tested and grossly at patient's baseline  PSYCH:  memory impaired    Labs done in SNF are in [facility]. Please refer to them using [employer]IC/Care Everywhere.    Assessment/Plan:  Dyspnea. Has now resolved. Resulting in an ED visit on 7/24/21 and 2/20/21. Question if related to anxiety as no obvious findings during ED visits. Weights overall stable over the past year. Could consider a rescue inhaler, but doubt patient would be able to understand how to use an inhaler secondary to dementia.     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 Chronic, progressive. Requires 24 hour supervision. Resides in secure memory unit of AL. Able to make needs known. Ambulates independently. Mini-Cog Assessment Score 1 on 6/25/18. SLUMS Score 2/30 on 11/13/19. Unable to tolerate Namenda. Staff to assist with ADLs and meals. Quetiapine las adjusted on 2/24/21 due to increased behaviors in the afternoon including exit seeking with aggression and paranoia. Also on Fluoxetine. Continue to monitor behaviors.      Hypertension. No recent blood pressures available to review. Monitor monthly. Takes Spironolactone-Hydrochlorothiazide and Metoprolol    Orders:  None    Electronically signed by: NIEVES Carranza CNP

## 2021-07-28 NOTE — LETTER
"    7/28/2021        RE: Slim Sams  [facility]  [address] [address] [address]         [facility]    Chief Complaint   Patient presents with     RECHECK     HPI:  Slim Sams is a 89 year old  (8/4/1924), who is being seen today for an episodic care visit at: [facility] ([facility]) [[id number]].     This is a 89-year-old female, with a past medical history significant for dementia, hypertension and adjustment disorder, who [facility]isted Living after living at home until September of 2018.    Today's concern is:     Dyspnea. Evaluated on 7/24/21 for shortness of breath. A chest x-ray revealed \"slightly low lung volumes. No obvious pneumonia somewhat streaky opacities in both lower lungs are new or more conspicuous when compared to 2/20/2021 but are favored to represent atelectasis or scar rather than pneumonia. Suspected slight cardiomegaly without pulmonary edema\". Labs revealed no acute findings. Hydrated with IV fluids and Ondansetron was administered in the case that nausea was causing her not to eat. Today, patient was sitting in the main room involved in activities. Now that activities are finished, states she needs to go home and her home is far from here. Agrees to walk to her room. Denies any shortness of breath.    Allergies, and PMH/PSH reviewed in EPIC today.  REVIEW OF SYSTEMS:  4 point ROS including Respiratory, CV, GI and , other than that noted in the HPI,  is negative    Objective:   Temp 97.5  F (36.4  C)   Wt 63.3 kg (139 lb 9.6 oz)   BMI 25.53 kg/m    GENERAL APPEARANCE:  Alert, in no distress  ENT:  Mouth and posterior oropharynx normal, moist mucous membranes  EYES:  EOM, conjunctivae, lids, pupils and irises normal  RESP:  respiratory effort and palpation of chest normal, lungs clear to auscultation , no respiratory distress  CV:  Palpation and " auscultation of heart done , regular rate and rhythm, no murmur, rub, or gallop  ABDOMEN:  normal bowel sounds, soft, nontender, no hepatosplenomegaly or other masses  M/S:   Active movement of bilateral upper and lower extremities. 1+ edema in BLE.  SKIN:  Inspection of skin and subcutaneous tissue baseline, Palpation of skin and subcutaneous tissue baseline  NEURO:   Cranial nerves 2-12 are normal tested and grossly at patient's baseline  PSYCH:  memory impaired    Labs done in SNF are in [facility]. Please refer to them using [employer]/Care Everywhere.    Assessment/Plan:  Dyspnea. Has now resolved. Resulting in an ED visit on 7/24/21 and 2/20/21. Question if related to anxiety as no obvious findings during ED visits. Weights overall stable over the past year. Could consider a rescue inhaler, but doubt patient would be able to understand how to use an inhaler secondary to dementia.     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 Chronic, progressive. Requires 24 hour supervision. Resides in secure memory unit of AL. Able to make needs known. Ambulates independently. Mini-Cog Assessment Score 1 on 6/25/18. SLUMS Score 2/30 on 11/13/19. Unable to tolerate Namenda. Staff to assist with ADLs and meals. Quetiapine las adjusted on 2/24/21 due to increased behaviors in the afternoon including exit seeking with aggression and paranoia. Also on Fluoxetine. Continue to monitor behaviors.      Hypertension. No recent blood pressures available to review. Monitor monthly. Takes Spironolactone-Hydrochlorothiazide and Metoprolol    Orders:  None    Electronically signed by: NIEVES Carranza CNP             Sincerely,        NIEVES Carranza CNP
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
## 2021-10-01 NOTE — TELEPHONE ENCOUNTER
[place] GERIATRIC SERVICES TELEPHONE ENCOUNTER    Chief Complaint   Patient presents with     Drowsiness     Slim Sams is a 89 year old  (8/4/1924) Spoke to staff at AL who report patient is more sleepy since Risperidone was increased on 9/22/21. Feels behaviors have improved overall since medication was increased.     ASSESSMENT/PLAN  Dementia with History of Adjustment Disorder with Anixety.    Decrease Risperidone to 0.5 mg PO every day in am and 0.75 mg PO every day in the pm    Decrease Fluoxetine to 10 mg PO every day x 1 week then discontinue as this may be contributing to increased anxiety    Electronically signed by:   NIEVES Carranza CNP

## 2021-11-02 NOTE — LETTER
11/2/2021        RE: Slim Sams  [facility]  [address]  [facility] [address]         HEALTH GERIATRIC SERVICES    Chief Complaint   Patient presents with     RECHECK     HPI:  Slim Sams is a 89 year old  (8/4/1924), who is being seen today for an episodic care visit at: [facility] [[id number]].     Background:     This is a 89-year-old female, with a past medical history significant for dementia, hypertension and adjustment disorder, who recently moved to [facility] from a different Assisted Living after living at home until September of 2018.    Today's concern is:     Dementia with History of Adjustment Disorder with Anxiety and Depression. Today, staff report patient is more tearful. Affect is more flat, less pleasant and conversational. Today, patient is sitting in her living room chair getting a message from the activities staff. Smiles during visit and has no complaints.     Loose Stools. On 10/5/21, staff reported patient was having loose stools. As goal is comfort, ordered Loperamide PRN with no further work-up. No reports of loose stools during today's visit. Upon review of documentation, has utilized Loperamide 1 time on 10/31/21.    Allergies, and PMH/PSH reviewed in EPIC today.  REVIEW OF SYSTEMS:  Limited secondary to cognitive impairment but today pt reports no pain    Objective:   /59   Pulse 54   Temp 96.9  F (36.1  C)   Resp 17   Wt 63.3 kg (139 lb 9.6 oz)   BMI 25.53 kg/m    GENERAL APPEARANCE:  Alert, in no distress  ENT:  Mouth and posterior oropharynx normal, moist mucous membranes  EYES:  EOM, conjunctivae, lids, pupils and irises normal  RESP:  no respiratory distress  PSYCH:  memory impaired      Labs done in SNF are in [place] [facility]. Please refer to them using [employer]Care Everywhere.    Assessment/Plan:  Dementia with History of Adjustment Disorder with Anxiety and  Depression. Chronic, progressive. Requires 24 hour supervision. Resides in secure [facility] unit [facility]. Staff to assist with ADLs and meals. Able to make needs known. Unable to complete SLUMS testing due to advanced dementia. Unable to tolerate Namenda in the past. Quetiapine discontinued on 8/19/21 due to increasing behaviors. Risperidone initiated on 8/19/21 with increase on 9/22/21 and slight decreased on 9/29/21 due to increased lethargy. As Fluoxetine may be contributing to anxiety, tapered off starting on 9/22/21. Given patient is more tearful, will initiate Citalopram 10 mg PO every day for depression. Left message for daughter in-law Jennifer, who returned phone call and noted she was in agreement with plan. Staff to continue to monitor mood. Will repeat BMP in the coming weeks to ensure Sodium is stable.     Loose Stools. Noted 10/5/21 and has since resolved.     Hypertension.  No recent blood pressures noted. Takes Metoprolol and Spironolactone-Hydrochlorothiazide.     Bilateral Lower Extremity Edema. Encourage elevation. Takes Spironolactone as noted above. Compression garments ordered.    Orders:  Citalopram 10 mg PO every day     Electronically signed by: NIEVES Carranza CNP             Sincerely,        NIEVES Carranza CNP

## 2021-11-03 NOTE — PROGRESS NOTES
[facility] GERIATRIC SERVICES    Chief Complaint   Patient presents with     RECHECK     HPI:  Slim Sams is a 89 year old  (8/4/1924), who is being seen today for an episodic care visit at: [facility] [[id number]].     Background:     This is a 89-year-old female, with a past medical history significant for dementia, hypertension and adjustment disorder, who recently moved to [facility] from a different Assisted Living after living at home until September of 2018.    Today's concern is:     Dementia with History of Adjustment Disorder with Anxiety and Depression. Today, staff report patient is more tearful. Affect is more flat, less pleasant and conversational. Today, patient is sitting in her living room chair getting a message from the activities staff. Smiles during visit and has no complaints.     Loose Stools. On 10/5/21, staff reported patient was having loose stools. As goal is comfort, ordered Loperamide PRN with no further work-up. No reports of loose stools during today's visit. Upon review of documentation, has utilized Loperamide 1 time on 10/31/21.    Allergies, and PMH/PSH reviewed in EPIC today.  REVIEW OF SYSTEMS:  Limited secondary to cognitive impairment but today pt reports no pain    Objective:   /59   Pulse 54   Temp 96.9  F (36.1  C)   Resp 17   Wt 63.3 kg (139 lb 9.6 oz)   BMI 25.53 kg/m    GENERAL APPEARANCE:  Alert, in no distress  ENT:  Mouth and posterior oropharynx normal, moist mucous membranes  EYES:  EOM, conjunctivae, lids, pupils and irises normal  RESP:  no respiratory distress  PSYCH:  memory impaired      Labs done in SNF are in [place] [facility]IC. Please refer to them using [employer]/Care Everywhere.    Assessment/Plan:  Dementia with History of Adjustment Disorder with Anxiety and Depression. Chronic, progressive. Requires 24 hour supervision. Resides in secure memory unit of AL. Staff to assist with ADLs and  meals. Able to make needs known. Unable to complete SLUMS testing due to advanced dementia. Unable to tolerate Namenda in the past. Quetiapine discontinued on 8/19/21 due to increasing behaviors. Risperidone initiated on 8/19/21 with increase on 9/22/21 and slight decreased on 9/29/21 due to increased lethargy. As Fluoxetine may be contributing to anxiety, tapered off starting on 9/22/21. Given patient is more tearful, will initiate Citalopram 10 mg PO every day for depression. Left message for daughter in-law Jennifer, who returned phone call and noted she was in agreement with plan. Staff to continue to monitor mood. Will repeat BMP in the coming weeks to ensure Sodium is stable.     Loose Stools. Noted 10/5/21 and has since resolved.     Hypertension.  No recent blood pressures noted. Takes Metoprolol and Spironolactone-Hydrochlorothiazide.     Bilateral Lower Extremity Edema. Encourage elevation. Takes Spironolactone as noted above. Compression garments ordered.    Orders:  Citalopram 10 mg PO every day     Electronically signed by: NIEVES Carranza CNP

## 2021-11-05 NOTE — TELEPHONE ENCOUNTER
[place] GERIATRIC SERVICES TELEPHONE ENCOUNTER    Chief Complaint   Patient presents with     Follow Up     Slim Sams is a 89 year old  (8/4/1924) Staff noted patient is more tearful. See 11/2/21 visit. Left message for daughter in-law, Jennifer, to initiate Citalopram 10 mg PO every day. Of note, daughter in-law has stated in the past she is in agreement with any medication changes with overall goal of comfort.    Orders:    Citalopram 10 mg PO every day Dx: Depression    Electronically signed by:   NIEVES Carranza CNP

## 2021-11-16 NOTE — TELEPHONE ENCOUNTER
[place] GERIATRIC SERVICES TELEPHONE ENCOUNTER    Chief Complaint   Patient presents with     Medication Question     Slim Sams is a 89 year old  (8/4/1924) Staff is requesting to crush medications as patient has difficulty swallowing pills.    Orders:    Ok to crush medications pending pharmacy approval    Electronically signed by:   NIEVES Carranza CNP

## 2021-12-20 NOTE — TELEPHONE ENCOUNTER
"[place] GERIATRIC SERVICES TELEPHONE ENCOUNTER    Chief Complaint   Patient presents with     Altered Mental Status     Slim Sams is a 89 year old  (8/4/1924), Nurse called today to report: On 12/15/21, patient was noted to have increased confusion by staff. A urine culture was ordered and revealed >100,000 CFU/ml Mixture of urogenital juan r. A repeat sample was obtained on 12/18/21 with the same result. Today, staff reports patient has a low grade temp of 99.5, confusion and weakness.  Report \"you can tell she does not feel well\".     ASSESSMENT/PLAN  Possible Urinary Tract Infection    As it is difficult to obtain a clean urine sample from patient, will treat as a UTI. If symptoms do not improve, consider other etiologies.    Orders  Nitrofurantoin 100 mg PO BID x 5 days Dx: UTI    Electronically signed by:   NIEVES Carranza CNP    "

## 2021-12-29 NOTE — PROGRESS NOTES
[facility] GERIATRIC SERVICES    Chief Complaint   Patient presents with     RECHECK     HPI:  Slim Sams is a 89 year old  (8/4/1924), who is being seen today for an episodic care visit at: [facility] [[id number]].     Background:    This is a 89-year-old female, with a past medical history significant for dementia, hypertension and adjustment disorder, who moved to [facility] from a different Assisted Living after living at home until September of 2018.    Today's concern is:     Dementia with History of Adjustment Disorder with Anxiety and Depression. Per staff report, patient has been more tearful. Sobbing at times. Cannot reassure. Requires more assistance with transfers. Less mobile. Weights 140 lbs on 11/20/20 -> 133 lbs on 12/13/21. Staff is questioning a hospice consult. Today, patient is sitting in the common area involved in activities. Has no concerns. Questions if writer sings.     Allergies, and PMH/PSH reviewed in EPIC today.  REVIEW OF SYSTEMS:  Limited secondary to cognitive impairment but today pt reports no concerns    Objective:   Temp 98.3  F (36.8  C)   Wt 60.3 kg (133 lb)   BMI 24.32 kg/m    GENERAL APPEARANCE:  Alert, in no distress  ENT:  Mouth and posterior oropharynx normal, moist mucous membranes  EYES:  EOM, conjunctivae, lids, pupils and irises normal  RESP:  no respiratory distress  PSYCH:  memory impaired     Labs done in SNF are in [facility]. Please refer to them using [employer]/Care Everywhere.    Assessment/Plan:  Dementia with History of Adjustment Disorder with Anxiety and Depression. Chronic, progressive. Requires 24 hour supervision. Resides in secure memory unit of AL. Staff to assist with ADLs and meals. Able to make needs known. Unable to complete SLUMS testing due to advanced dementia. Unable to tolerate Namenda in the past. Quetiapine discontinued on 8/19/21 due to increasing behaviors. Risperidone  initiated on 8/19/21 with increase on 9/22/21 and slight decrease on 9/29/21 due to increased lethargy. As Fluoxetine was thought to be contributing to anxiety, tapered off starting on 9/22/21. Citalopram 10 mg PO every day initiated on 11/2/21 due to increased tearfulness and flat affect. As patient has had some weight loss and functional decline, will have [facility] evaluate and treat. Staff to update family regarding hospice consult.     Hypertension.  No recent blood pressures noted. Takes Metoprolol and Spironolactone-Hydrochlorothiazide.      Bilateral Lower Extremity Edema. Encourage elevation. Takes Spironolactone as noted above. Compression garments ordered.    History of Falls. Last fall 8/17/21.      Orders:  Ok for [facility] to evaluate and treat    Electronically signed by: NIEVES Carranza CNP

## 2021-12-29 NOTE — LETTER
12/29/2021        RE: Slim Sams  [facility]  [address]  [facility] [address]         HEALTH GERIATRIC SERVICES    Chief Complaint   Patient presents with     RECHECK     HPI:  Slim Sams is a 89 year old  (8/4/1924), who is being seen today for an episodic care visit at: [facility] [[id number]].     Background:    This is a 89-year-old female, with a past medical history significant for dementia, hypertension and adjustment disorder, who moved to [facility] from a different Assisted Living after living at home until September of 2018.    Today's concern is:     Dementia with History of Adjustment Disorder with Anxiety and Depression. Per staff report, patient has been more tearful. Sobbing at times. Cannot reassure. Requires more assistance with transfers. Less mobile. Weights 140 lbs on 11/20/20 -> 133 lbs on 12/13/21. Staff is questioning a hospice consult. Today, patient is sitting in the common area involved in activities. Has no concerns. Questions if writer sings.     Allergies, and PMH/PSH reviewed in [facility] today.  REVIEW OF SYSTEMS:  Limited secondary to cognitive impairment but today pt reports no concerns    Objective:   Temp 98.3  F (36.8  C)   Wt 60.3 kg (133 lb)   BMI 24.32 kg/m    GENERAL APPEARANCE:  Alert, in no distress  ENT:  Mouth and posterior oropharynx normal, moist mucous membranes  EYES:  EOM, conjunctivae, lids, pupils and irises normal  RESP:  no respiratory distress  PSYCH:  memory impaired     [facility]. Please refer to them using [facility] Everywhere.    Assessment/Plan:  Dementia with History of Adjustment Disorder with Anxiety and Depression. Chronic, progressive. Requires 24 hour supervision. Resides in secure memory unit of AL. Staff to assist with ADLs and meals. Able to make needs known. Unable to complete SLUMS testing due to advanced  dementia. Unable to tolerate Namenda in the past. Quetiapine discontinued on 8/19/21 due to increasing behaviors. Risperidone initiated on 8/19/21 with increase on 9/22/21 and slight decrease on 9/29/21 due to increased lethargy. As Fluoxetine was thought to be contributing to anxiety, tapered off starting on 9/22/21. Citalopram 10 mg PO every day initiated on 11/2/21 due to increased tearfulness and flat affect. As patient has had some weight loss and functional decline, will have [facility] evaluate and treat. Staff to update family regarding hospice consult.     Hypertension.  No recent blood pressures noted. Takes Metoprolol and Spironolactone-Hydrochlorothiazide.      Bilateral Lower Extremity Edema. Encourage elevation. Takes Spironolactone as noted above. Compression garments ordered.    History of Falls. Last fall 8/17/21.      Orders:  Ok for [facility] to evaluate and treat    Electronically signed by: NIEVES Carranza CNP             Sincerely,        NIEVES Carranza CNP

## 2021-12-31 NOTE — TELEPHONE ENCOUNTER
"[facility] called and stated that the admitting diagnosis for hospice has changed to \"Dementia with behaviors\".  Hospice CTI will need to be changed and re-signed.  Form is currently at facility.   "

## 2022-01-01 DIAGNOSIS — F43.22 ADJUSTMENT DISORDER WITH ANXIETY: Primary | ICD-10-CM

## 2022-01-01 RX ORDER — DIVALPROEX SODIUM 125 MG/1
CAPSULE, COATED PELLETS ORAL
Qty: 56 CAPSULE | Refills: 10 | Status: SHIPPED | OUTPATIENT
Start: 2022-01-01

## 2022-10-04 PROBLEM — F03.918 DEMENTIA WITH BEHAVIORAL DISTURBANCE (H): Status: ACTIVE | Noted: 2021-01-01
